# Patient Record
Sex: FEMALE | Race: WHITE | NOT HISPANIC OR LATINO | Employment: OTHER | URBAN - METROPOLITAN AREA
[De-identification: names, ages, dates, MRNs, and addresses within clinical notes are randomized per-mention and may not be internally consistent; named-entity substitution may affect disease eponyms.]

---

## 2023-02-19 PROBLEM — R29.898 DECREASED ROM OF NECK: Status: ACTIVE | Noted: 2023-02-19

## 2023-02-19 PROBLEM — R93.89 ABNORMAL CHEST CT: Status: ACTIVE | Noted: 2023-02-19

## 2023-02-19 PROBLEM — R06.02 SHORTNESS OF BREATH: Status: ACTIVE | Noted: 2023-02-19

## 2023-02-19 PROBLEM — I50.20 SYSTOLIC CHF (MULTI): Status: ACTIVE | Noted: 2023-02-19

## 2023-02-19 PROBLEM — M54.50 LOW BACK PAIN: Status: ACTIVE | Noted: 2023-02-19

## 2023-02-19 PROBLEM — I73.9 CLAUDICATION OF BOTH LOWER EXTREMITIES (CMS-HCC): Status: ACTIVE | Noted: 2023-02-19

## 2023-02-19 PROBLEM — R05.9 COUGH: Status: RESOLVED | Noted: 2023-02-19 | Resolved: 2023-02-19

## 2023-02-19 PROBLEM — S16.1XXA CERVICAL MYOFASCIAL STRAIN: Status: ACTIVE | Noted: 2023-02-19

## 2023-02-19 PROBLEM — Z86.010 HISTORY OF COLON POLYPS: Status: ACTIVE | Noted: 2023-02-19

## 2023-02-19 PROBLEM — M48.061 LUMBAR SPINAL STENOSIS: Status: ACTIVE | Noted: 2023-02-19

## 2023-02-19 PROBLEM — G47.00 INSOMNIA: Status: ACTIVE | Noted: 2023-02-19

## 2023-02-19 PROBLEM — H91.93 BILATERAL HEARING LOSS: Status: ACTIVE | Noted: 2023-02-19

## 2023-02-19 PROBLEM — M25.552 HIP PAIN, BILATERAL: Status: ACTIVE | Noted: 2023-02-19

## 2023-02-19 PROBLEM — M65.30 TRIGGER FINGER: Status: ACTIVE | Noted: 2023-02-19

## 2023-02-19 PROBLEM — M70.61 GREATER TROCHANTERIC BURSITIS OF BOTH HIPS: Status: ACTIVE | Noted: 2023-02-19

## 2023-02-19 PROBLEM — R26.89 BALANCE PROBLEM: Status: ACTIVE | Noted: 2023-02-19

## 2023-02-19 PROBLEM — M85.80 OSTEOPENIA: Status: ACTIVE | Noted: 2023-02-19

## 2023-02-19 PROBLEM — M25.562 LEFT KNEE PAIN: Status: ACTIVE | Noted: 2023-02-19

## 2023-02-19 PROBLEM — I25.10 CORONARY ARTERY ARTERIOSCLEROSIS: Status: ACTIVE | Noted: 2023-02-19

## 2023-02-19 PROBLEM — K29.70 GASTRITIS: Status: RESOLVED | Noted: 2023-02-19 | Resolved: 2023-02-19

## 2023-02-19 PROBLEM — Z86.0100 HISTORY OF COLON POLYPS: Status: ACTIVE | Noted: 2023-02-19

## 2023-02-19 PROBLEM — M54.14 THORACIC NEURITIS: Status: ACTIVE | Noted: 2023-02-19

## 2023-02-19 PROBLEM — J20.9 BRONCHITIS, ACUTE, WITH BRONCHOSPASM: Status: RESOLVED | Noted: 2023-02-19 | Resolved: 2023-02-19

## 2023-02-19 PROBLEM — E55.9 VITAMIN D DEFICIENCY: Status: ACTIVE | Noted: 2023-02-19

## 2023-02-19 PROBLEM — E04.2 NONTOXIC MULTINODULAR GOITER: Status: ACTIVE | Noted: 2023-02-19

## 2023-02-19 PROBLEM — I10 BENIGN ESSENTIAL HYPERTENSION: Status: ACTIVE | Noted: 2023-02-19

## 2023-02-19 PROBLEM — R09.81 NASAL CONGESTION: Status: RESOLVED | Noted: 2023-02-19 | Resolved: 2023-02-19

## 2023-02-19 PROBLEM — M53.3 SACROILIAC JOINT DYSFUNCTION OF LEFT SIDE: Status: ACTIVE | Noted: 2023-02-19

## 2023-02-19 PROBLEM — R29.898 RIGHT ARM WEAKNESS: Status: RESOLVED | Noted: 2023-02-19 | Resolved: 2023-02-19

## 2023-02-19 PROBLEM — M47.816 LUMBAR SPONDYLOSIS: Status: ACTIVE | Noted: 2023-02-19

## 2023-02-19 PROBLEM — R63.4 ABNORMAL WEIGHT LOSS: Status: ACTIVE | Noted: 2023-02-19

## 2023-02-19 PROBLEM — M70.62 GREATER TROCHANTERIC BURSITIS OF BOTH HIPS: Status: ACTIVE | Noted: 2023-02-19

## 2023-02-19 PROBLEM — D64.9 ANEMIA: Status: ACTIVE | Noted: 2023-02-19

## 2023-02-19 PROBLEM — K21.9 GERD (GASTROESOPHAGEAL REFLUX DISEASE): Status: ACTIVE | Noted: 2023-02-19

## 2023-02-19 PROBLEM — N32.81 OVERACTIVE BLADDER: Status: ACTIVE | Noted: 2023-02-19

## 2023-02-19 PROBLEM — M54.16 RIGHT LUMBAR RADICULOPATHY: Status: ACTIVE | Noted: 2023-02-19

## 2023-02-19 PROBLEM — R29.898 WEAKNESS OF LEFT HIP: Status: ACTIVE | Noted: 2023-02-19

## 2023-02-19 PROBLEM — R07.9 CHEST PAIN: Status: ACTIVE | Noted: 2023-02-19

## 2023-02-19 PROBLEM — F32.5 MAJOR DEPRESSION IN REMISSION (CMS-HCC): Status: ACTIVE | Noted: 2023-02-19

## 2023-02-19 PROBLEM — M79.18 MYOFASCIAL PAIN SYNDROME: Status: ACTIVE | Noted: 2023-02-19

## 2023-02-19 PROBLEM — R35.0 INCREASED URINARY FREQUENCY: Status: ACTIVE | Noted: 2023-02-19

## 2023-02-19 PROBLEM — M25.551 HIP PAIN, BILATERAL: Status: ACTIVE | Noted: 2023-02-19

## 2023-02-19 PROBLEM — M16.9 HIP OSTEOARTHRITIS: Status: ACTIVE | Noted: 2023-02-19

## 2023-02-19 PROBLEM — M54.2 NECK PAIN: Status: RESOLVED | Noted: 2023-02-19 | Resolved: 2023-02-19

## 2023-02-19 PROBLEM — H35.30 MACULAR DEGENERATION, LEFT EYE: Status: ACTIVE | Noted: 2023-02-19

## 2023-02-19 PROBLEM — J98.01 BRONCHOSPASM, ACUTE: Status: RESOLVED | Noted: 2023-02-19 | Resolved: 2023-02-19

## 2023-02-19 RX ORDER — VITS A,C,E/LUTEIN/MINERALS 300MCG-200
1 TABLET ORAL DAILY
COMMUNITY
Start: 2016-06-21

## 2023-02-19 RX ORDER — SERTRALINE HYDROCHLORIDE 50 MG/1
1 TABLET, FILM COATED ORAL DAILY
COMMUNITY
Start: 2013-04-16 | End: 2024-02-12

## 2023-02-19 RX ORDER — ASPIRIN 81 MG/1
1 TABLET ORAL DAILY
COMMUNITY
Start: 2019-08-16

## 2023-02-19 RX ORDER — ACETAMINOPHEN 500 MG
2 TABLET ORAL
COMMUNITY
Start: 2019-10-10

## 2023-02-19 RX ORDER — IPRATROPIUM BROMIDE 42 UG/1
1 SPRAY, METERED NASAL 3 TIMES DAILY
COMMUNITY
Start: 2022-03-09

## 2023-02-19 RX ORDER — ROSUVASTATIN CALCIUM 5 MG/1
1 TABLET, COATED ORAL DAILY
COMMUNITY
Start: 2020-08-04 | End: 2023-05-18

## 2023-02-19 RX ORDER — NITROGLYCERIN 0.4 MG/1
1 TABLET SUBLINGUAL EVERY 5 MIN PRN
COMMUNITY
Start: 2019-10-09

## 2023-02-19 RX ORDER — AMLODIPINE BESYLATE 10 MG/1
1 TABLET ORAL DAILY
COMMUNITY
Start: 2022-06-22

## 2023-02-19 RX ORDER — LORAZEPAM 0.5 MG/1
1 TABLET ORAL NIGHTLY PRN
COMMUNITY
Start: 2013-05-22 | End: 2023-08-16 | Stop reason: SDUPTHER

## 2023-02-19 RX ORDER — ALBUTEROL SULFATE 90 UG/1
1-2 AEROSOL, METERED RESPIRATORY (INHALATION) EVERY 4 HOURS PRN
COMMUNITY
Start: 2022-04-15 | End: 2023-06-06 | Stop reason: SDUPTHER

## 2023-02-19 RX ORDER — MULTIVITAMIN
1 TABLET ORAL DAILY
COMMUNITY
Start: 2020-05-12

## 2023-02-19 RX ORDER — ASCORBIC ACID 500 MG
1 TABLET ORAL DAILY
COMMUNITY
Start: 2016-06-21

## 2023-02-19 RX ORDER — METOPROLOL SUCCINATE 25 MG/1
0.5 TABLET, EXTENDED RELEASE ORAL
COMMUNITY
Start: 2021-10-12 | End: 2024-05-02 | Stop reason: SDUPTHER

## 2023-05-17 DIAGNOSIS — I25.10 CORONARY ARTERY ARTERIOSCLEROSIS: ICD-10-CM

## 2023-05-18 RX ORDER — ROSUVASTATIN CALCIUM 5 MG/1
TABLET, COATED ORAL
Qty: 90 TABLET | Refills: 3 | Status: SHIPPED | OUTPATIENT
Start: 2023-05-18

## 2023-05-23 ENCOUNTER — TELEPHONE (OUTPATIENT)
Dept: PRIMARY CARE | Facility: CLINIC | Age: 83
End: 2023-05-23
Payer: MEDICARE

## 2023-05-23 DIAGNOSIS — J40 BRONCHITIS: Primary | ICD-10-CM

## 2023-05-23 RX ORDER — AZITHROMYCIN 250 MG/1
TABLET, FILM COATED ORAL
Qty: 6 TABLET | Refills: 0 | Status: SHIPPED | OUTPATIENT
Start: 2023-05-23 | End: 2023-05-28

## 2023-05-23 RX ORDER — PREDNISONE 10 MG/1
TABLET ORAL
Qty: 33 TABLET | Refills: 0 | Status: SHIPPED | OUTPATIENT
Start: 2023-05-23 | End: 2023-05-31

## 2023-05-23 NOTE — TELEPHONE ENCOUNTER
Patient has URI x 2 weeks has moved into her chest-coughing some sob-asking for antibx   [facility]    Allergy-Codeine/Apixaban/Atorvastatin/Rivaroxaban     Called patient and informed-she has appt. Next week will recheck then.

## 2023-05-29 ASSESSMENT — ENCOUNTER SYMPTOMS
POLYDIPSIA: 0
BACK PAIN: 0
HEADACHES: 0
PALPITATIONS: 0
FEVER: 0
ADENOPATHY: 0
HEMATURIA: 0
SHORTNESS OF BREATH: 0
EYE PAIN: 0
FATIGUE: 0
EYE REDNESS: 0
BRUISES/BLEEDS EASILY: 0
BLOOD IN STOOL: 0
CHILLS: 0
HALLUCINATIONS: 0
COUGH: 0
ABDOMINAL PAIN: 0
NECK STIFFNESS: 0
DYSURIA: 0
RHINORRHEA: 0
WOUND: 0
SORE THROAT: 0
WEAKNESS: 0

## 2023-05-29 NOTE — PROGRESS NOTES
Subjective   Reason for Visit: Marli Duckworth is an 83 y.o. female here for a Medicare Wellness visit.               HPI    Patient Care Team:  Scooby Monzon MD as PCP - General  Scooby Monzon MD as PCP - [employer]vantage PCP     Review of Systems   Constitutional:  Negative for chills, fatigue and fever.   HENT:  Negative for rhinorrhea and sore throat.    Eyes:  Negative for pain and redness.   Respiratory:  Negative for cough and shortness of breath.    Cardiovascular:  Negative for chest pain and palpitations.   Gastrointestinal:  Negative for abdominal pain and blood in stool.   Endocrine: Negative for polydipsia and polyuria.   Genitourinary:  Negative for dysuria and hematuria.   Musculoskeletal:  Negative for back pain and neck stiffness.   Skin:  Negative for rash and wound.   Allergic/Immunologic: Negative for environmental allergies and food allergies.   Neurological:  Negative for weakness and headaches.   Hematological:  Negative for adenopathy. Does not bruise/bleed easily.   Psychiatric/Behavioral:  Negative for hallucinations and suicidal ideas.      Objective   Vitals:  There were no vitals taken for this visit.      Physical Exam  Vitals reviewed.   Constitutional:       General: She is not in acute distress.     Appearance: She is not ill-appearing.   HENT:      Head: Normocephalic and atraumatic.      Right Ear: Tympanic membrane normal.      Left Ear: Tympanic membrane normal.      Nose: No congestion or rhinorrhea.      Mouth/Throat:      Pharynx: No oropharyngeal exudate or posterior oropharyngeal erythema.   Eyes:      Extraocular Movements: Extraocular movements intact.      Conjunctiva/sclera: Conjunctivae normal.      Pupils: Pupils are equal, round, and reactive to light.   Cardiovascular:      Rate and Rhythm: Normal rate and regular rhythm.      Heart sounds: No murmur heard.     No friction rub. No gallop.   Pulmonary:      Effort: Pulmonary effort is normal.       Breath sounds: Normal breath sounds. No wheezing, rhonchi or rales.   Abdominal:      General: There is no distension.      Palpations: Abdomen is soft.      Tenderness: There is no abdominal tenderness. There is no guarding or rebound.   Musculoskeletal:         General: No swelling or deformity.      Cervical back: Normal range of motion and neck supple.      Right lower leg: No edema.      Left lower leg: No edema.   Skin:     Capillary Refill: Capillary refill takes less than 2 seconds.      Coloration: Skin is not jaundiced.      Findings: No rash.   Neurological:      General: No focal deficit present.      Mental Status: She is alert.      Motor: No weakness.   Psychiatric:         Mood and Affect: Mood normal.         Behavior: Behavior normal.     Assessment/Plan   Problem List Items Addressed This Visit    None           # New Left neuroclaudication and left weakness to dorsiflexion, knee extension and hip flexion -- check l-spine xray, repeat MRI, consult Dr León and if needed Dr Arenas with neurosurgery. Order PT and EMG. Normal ABGs a year ago 2021.    # cervical spinal stenosis and lumbar spondylosis -- tried low dose diclofenac for pain in the past. uses aleve sparingly. Starting PT at [facility] 8/2020, improved with prednisone burst in July. Feels paresthesia when turns to the right. May need to consult Dr León if no better.     # Lumbar herniated discs -- improved after LAYTON per Dr León 2017.    # Hx Left knee pain -- anserine bursitis and neuropathy at fibular head -- improved with stretching exercises, ice, rest, brace and depomedrol 80 mg IM x 1 a year ago (7/26/21). If no better can refer to ortho.     # Insomnia, well controlled with lorazepam 0.5 mg q.h.s.   OARRS report reviewed 8/4/2020, 2/2/2021, 7/26/21, 2/1/2022, 7/29/22  BENZO AGREEMENT Signed 4/6/18.4/10/19 4/6/2020, 7/26/21, 7/29/22  Tox screen 4/6/18, 7/10/19, 8/4/2020.2/1/22    # Vague abdominal pain and bloating after meals -- c/w  suspected mesenteric ischemia based on CT findings of atherosclerosis from 2020. Will try ntg as needed. claims she was previously on isorbide but was taken off. Recommend sticking to smaller meals. If no improvement will refer to Dr Mackenzie for Colonoscopy and EGD.     # CAD -- recent ACS s/p DONNY x 2 to RCA 8/2019 -- continue Statin, BB, Plavix/ASA and follow up with cardio. Completed 4 visits of cardiac rehab. Has 65% obstruction remaining that might require future stenting if symptoms recur. NTG sl as needed for cp. Has not needed yet. To call office or cardiologist or go to ER in case she needs to use them.     # Left SI joint pain -- Still bothersome despite doing stretching exercises demonstrated at 10/2018 visit. Since no better with stretching can refer to PT/Dr De La Cruz.     # Osteoporosis (T -2.4) -- despite previous HRT. Stablilizied on Dexa scan 2018. declines generic fosamax 70 weekly or generic boniva every month 150 mg. Had reclast in May 2016, 2017, 2018 (confirmed with Brenda Guardado RN in infusion center). Hold off on further reclast until recheck DEXA again after 3/5/2020. If worsening can try prolia injections. If stable -- now that she has quit smoking she will likely be good to go without anything. Started on vitamin D 4000 units daily.    # hypertension. well tolerated on amlodipine 10 mg once a day, metoprolol 25 ER.     # depression continue zoloft 50. Buproprion did NOT help her quit smoking so stopped.    # hx smoking --quit smoking Aug 2019!!!. Back to 1/2 ppd 2022.    # OAB --. DID NOT TOLERATE OXYBUTYNIN or even Toviaz. Cannot afford myrbetriq.     #GERD/Gastritis -- start acidophilus and otc pepcid 20 mg daily. No better with trial of pantoprazole. No more aspirin. No more xarelto.     # Paroxysmal AFIB -- x1 while on prednisone for her back. In sinus rhythm now. No symptoms of recurrence. In ER 4/2-4/3/18 for cardiac rule out -- no sign of recurrent AFIB. Can check holter/event recorder  if symptoms recur.     # trigger finger surgery per Dr Jolley -- 9/2019    # Macular Degeneration -- wet, left eye -- Eylea injections per Dr Dove ([facility]).     # 2/1/2022 [place] Preventative exam: counseled on healthy diet and regular exercise. No longer needs routine mammograms/colonoscopies based on age but had colon polyp in 2019 15 mm in cecum, and 25 mm polypc in 2022 -- repeat in 2023 with Dr Sánchez. Boosted for COVID.     # 2/1/2022 Medicare Wellness -- no falls, depression well controlled with sertraline, quit smoking 8/2019. No dementia. No loss of adls. No opiates. benzos effective to help with sleep. Mild hearing loss exacerbated by everyone wearing masks. Care Team -- myself (pcp), caio (cardio). On reclast for one more dose (5th dose was due in 2021 but never went due to COVID) for osteoporosis. Vaccines up to date. Had 1st shingles vaccine.     # 2/1/2022 I spent 15 minutes face-to-face with this individual discussing their cardiovascular risk and behavioral therapies of nutritional choices, exercise, and elimination of habits contributing to risk. We agreed on a plan addressing reduction of their current cardiovascular risk. For patients with risk calculated >10%, aspirin use was discussed and encouraged unless known allergy or increased risk of bleeding contraindicates use. Patient's 10 year CV risk estimate calculates to: >10%     # 2/1/2022 counseled on smoking <1/2 ppd -- contemplative. 5 minutes.     # 4/2022 Acute bronchitis -- no better with medrol pack and zpak. treat with augmentin and prednisone longer burst and taper.     # 6/26/2022 [place] [facility] -- COPD exacerbation.     Trial of xarelto ended up causing upper gi bleed. Still holding off on aspirin which is ok - -no hx of cad. No f/c/n/v/d/cp/sob. Has stopped ppi, iron, and colace. Takes occasional rolaids.    4/13/16, 4/22/15 us per DR Gilliland benign colloid cysts. no need for fna -- repeat in 3y (2019).   4/21/16 NUCLEAR  STRESS -- no ischemia but had AFLUTTER on ecg. MP   6/16/16 EGD showed reactrive gastropathy, no cancer or HPylori.  9/19/17 s/p L4/L5/S1 medal branch blocks per Dr León.  10/5/17, 7/27/17, 6/22/17 URO -- [facility] --pessary/atrophic vaginitis, cystocele.  6/2018 s/p Right Trigger Finger repair per Shola.  7/25/18 MRI L-Spine progressive herniated discs and spondylosis.  12/3/18 s/p small bowel resection per Dr Hernández.  5/13/19 s/p Sling/cystocele repair -- Brenda.  7/18/19 COLONOSCOPY -- 3 polyps -- Edgar -- repeat in 6 months. 1/2020.  8/2019 Inferior ACS -- s/p DONNY x 2   8/2019 Stress test -- inferior ischemia resolved with rest.  8/2019 ECHO -- ef 40%.  2/25/2020 audiometry -- b/l sensorineural hearing loss -- recommend aides.  5/27/2020 DEXA -- osteopenia -2.4 -- continue calcium and vitamin d supplements and weight bearing exercise.  6/2020 Reclast, previously 5/2016, 5/2017, 5/2018. Missed 2019. Should get 2020 and 2021 then stop -- after completing 5 years of bisophophonate.  1/2021 RETINA -- Juan (at [facility]). Eylea injection. Left Wet MD.  10/12/21 CARDIO -- [facility] -- CAD. NNO. Off Plavix, ON ASA indefinitely.  1/2-1/5/2022 Acute GI Bleed -- COLONOSCOPY 1/7/2022 2.5 cm cecal polyp -- adenoma.

## 2023-05-31 ENCOUNTER — APPOINTMENT (OUTPATIENT)
Dept: PRIMARY CARE | Facility: CLINIC | Age: 83
End: 2023-05-31
Payer: MEDICARE

## 2023-06-06 DIAGNOSIS — R06.02 SHORTNESS OF BREATH: Primary | ICD-10-CM

## 2023-06-06 RX ORDER — ALBUTEROL SULFATE 90 UG/1
1-2 AEROSOL, METERED RESPIRATORY (INHALATION) EVERY 4 HOURS PRN
Qty: 18 G | Refills: 3 | Status: SHIPPED | OUTPATIENT
Start: 2023-06-06

## 2023-08-16 ENCOUNTER — OFFICE VISIT (OUTPATIENT)
Dept: PRIMARY CARE | Facility: CLINIC | Age: 83
End: 2023-08-16
Payer: MEDICARE

## 2023-08-16 VITALS
HEIGHT: 61 IN | BODY MASS INDEX: 20.69 KG/M2 | OXYGEN SATURATION: 97 % | SYSTOLIC BLOOD PRESSURE: 124 MMHG | HEART RATE: 65 BPM | RESPIRATION RATE: 16 BRPM | DIASTOLIC BLOOD PRESSURE: 64 MMHG | WEIGHT: 109.6 LBS | TEMPERATURE: 97.7 F

## 2023-08-16 DIAGNOSIS — I50.22 CHRONIC SYSTOLIC CONGESTIVE HEART FAILURE (MULTI): ICD-10-CM

## 2023-08-16 DIAGNOSIS — Z00.00 MEDICARE ANNUAL WELLNESS VISIT, SUBSEQUENT: Primary | ICD-10-CM

## 2023-08-16 DIAGNOSIS — E55.9 VITAMIN D INSUFFICIENCY: ICD-10-CM

## 2023-08-16 DIAGNOSIS — Z12.11 SCREEN FOR COLON CANCER: ICD-10-CM

## 2023-08-16 DIAGNOSIS — I25.10 CORONARY ARTERY ARTERIOSCLEROSIS: ICD-10-CM

## 2023-08-16 DIAGNOSIS — F51.01 PRIMARY INSOMNIA: ICD-10-CM

## 2023-08-16 DIAGNOSIS — I73.9 CLAUDICATION OF BOTH LOWER EXTREMITIES (CMS-HCC): ICD-10-CM

## 2023-08-16 DIAGNOSIS — I48.0 PAROXYSMAL A-FIB (MULTI): ICD-10-CM

## 2023-08-16 DIAGNOSIS — F17.200 CURRENT SMOKER: ICD-10-CM

## 2023-08-16 DIAGNOSIS — Z79.899 MEDICATION MANAGEMENT: ICD-10-CM

## 2023-08-16 DIAGNOSIS — F32.5 MAJOR DEPRESSION IN REMISSION (CMS-HCC): ICD-10-CM

## 2023-08-16 DIAGNOSIS — J43.8 OTHER EMPHYSEMA (MULTI): ICD-10-CM

## 2023-08-16 DIAGNOSIS — M16.9 OSTEOARTHRITIS OF HIP, UNSPECIFIED LATERALITY, UNSPECIFIED OSTEOARTHRITIS TYPE: ICD-10-CM

## 2023-08-16 DIAGNOSIS — I10 BENIGN ESSENTIAL HYPERTENSION: ICD-10-CM

## 2023-08-16 PROCEDURE — 1170F FXNL STATUS ASSESSED: CPT | Performed by: FAMILY MEDICINE

## 2023-08-16 PROCEDURE — 1160F RVW MEDS BY RX/DR IN RCRD: CPT | Performed by: FAMILY MEDICINE

## 2023-08-16 PROCEDURE — 3078F DIAST BP <80 MM HG: CPT | Performed by: FAMILY MEDICINE

## 2023-08-16 PROCEDURE — 1159F MED LIST DOCD IN RCRD: CPT | Performed by: FAMILY MEDICINE

## 2023-08-16 PROCEDURE — 99406 BEHAV CHNG SMOKING 3-10 MIN: CPT | Performed by: FAMILY MEDICINE

## 2023-08-16 PROCEDURE — 99214 OFFICE O/P EST MOD 30 MIN: CPT | Performed by: FAMILY MEDICINE

## 2023-08-16 PROCEDURE — 3074F SYST BP LT 130 MM HG: CPT | Performed by: FAMILY MEDICINE

## 2023-08-16 PROCEDURE — 1126F AMNT PAIN NOTED NONE PRSNT: CPT | Performed by: FAMILY MEDICINE

## 2023-08-16 PROCEDURE — 4004F PT TOBACCO SCREEN RCVD TLK: CPT | Performed by: FAMILY MEDICINE

## 2023-08-16 PROCEDURE — 99397 PER PM REEVAL EST PAT 65+ YR: CPT | Performed by: FAMILY MEDICINE

## 2023-08-16 PROCEDURE — G0439 PPPS, SUBSEQ VISIT: HCPCS | Performed by: FAMILY MEDICINE

## 2023-08-16 PROCEDURE — G0446 INTENS BEHAVE THER CARDIO DX: HCPCS | Performed by: FAMILY MEDICINE

## 2023-08-16 RX ORDER — LORAZEPAM 0.5 MG/1
0.5 TABLET ORAL NIGHTLY PRN
Qty: 90 TABLET | Refills: 0 | Status: SHIPPED | OUTPATIENT
Start: 2023-08-16 | End: 2024-01-12

## 2023-08-16 ASSESSMENT — ENCOUNTER SYMPTOMS
OCCASIONAL FEELINGS OF UNSTEADINESS: 0
BACK PAIN: 0
LOSS OF SENSATION IN FEET: 0
ABDOMINAL PAIN: 0
EYE REDNESS: 0
BRUISES/BLEEDS EASILY: 0
NECK STIFFNESS: 0
HALLUCINATIONS: 0
WEAKNESS: 0
FATIGUE: 0
HEMATURIA: 0
RHINORRHEA: 0
ADENOPATHY: 0
SORE THROAT: 0
BLOOD IN STOOL: 0
FEVER: 0
PALPITATIONS: 0
CHILLS: 0
POLYDIPSIA: 0
EYE PAIN: 0
DEPRESSION: 0
HEADACHES: 0
WOUND: 0
DYSURIA: 0
COUGH: 0
SHORTNESS OF BREATH: 0

## 2023-08-16 ASSESSMENT — ACTIVITIES OF DAILY LIVING (ADL)
DOING_HOUSEWORK: INDEPENDENT
MANAGING_FINANCES: INDEPENDENT
DRESSING: INDEPENDENT
TAKING_MEDICATION: INDEPENDENT
BATHING: INDEPENDENT
GROCERY_SHOPPING: INDEPENDENT

## 2023-08-16 ASSESSMENT — PATIENT HEALTH QUESTIONNAIRE - PHQ9
1. LITTLE INTEREST OR PLEASURE IN DOING THINGS: NOT AT ALL
SUM OF ALL RESPONSES TO PHQ9 QUESTIONS 1 AND 2: 0
2. FEELING DOWN, DEPRESSED OR HOPELESS: NOT AT ALL

## 2023-08-16 NOTE — PROGRESS NOTES
Subjective   Reason for Visit: Marli Duckworth is an 83 y.o. female here for a Medicare Wellness visit. Annual [employer] preventative exam and recheck on Insomnia, COPD and CAD.     OARRS:  Scooby Monzon MD on 8/16/2023 11:54 AM  I have personally reviewed the OARRS report for Marli Duckworth. I have considered the risks of abuse, dependence, addiction and diversion, I believe that it is clinically appropriate for Marli Duckworth to be prescribed this medication, and I have the following concerns none    Is the patient prescribed a combination of a benzodiazepine and opioid?  No    Last Urine Drug Screen / ordered today: Yes  Recent Results (from the past 80989 hour(s))   OPIATE/OPIOID/BENZO PRESCRIPTION COMPLIANCE    Collection Time: 02/01/22 11:48 AM   Result Value Ref Range    DRUG SCREEN COMMENT URINE SEE BELOW     Creatine, Urine 86.0 mg/dL    Amphetamine Screen, Urine PRESUMPTIVE NEGATIVE NEGATIVE    Barbiturate Screen, Urine PRESUMPTIVE NEGATIVE NEGATIVE    Cannabinoid Screen, Urine PRESUMPTIVE NEGATIVE NEGATIVE    Cocaine Screen, Urine PRESUMPTIVE NEGATIVE NEGATIVE    PCP Screen, Urine PRESUMPTIVE NEGATIVE NEGATIVE    7-Aminoclonazepam <25 Cutoff <25 ng/mL    Alpha-Hydroxyalprazolam <25 Cutoff <25 ng/mL    Alpha-Hydroxymidazolam <25 Cutoff <25 ng/mL    Alprazolam <25 Cutoff <25 ng/mL    Chlordiazepoxide <25 Cutoff <25 ng/mL    Clonazepam <25 Cutoff <25 ng/mL    Diazepam <25 Cutoff <25 ng/mL    Lorazepam 171 (A) Cutoff <25 ng/mL    Midazolam <25 Cutoff <25 ng/mL    Nordiazepam <25 Cutoff <25 ng/mL    Oxazepam <25 Cutoff <25 ng/mL    Temazepam <25 Cutoff <25 ng/mL    Zolpidem <25 Cutoff <25 ng/mL    Zolpidem Metabolite (ZCA) <25 Cutoff <25 ng/mL    6-Acetylmorphine <25 Cutoff <25 ng/mL    Codeine <50 Cutoff <50 ng/mL    Hydrocodone <25 Cutoff <25 ng/mL    Hydromorphone <25 Cutoff <25 ng/mL    Morphine Urine <50 Cutoff <50 ng/mL    Norhydrocodone <25 Cutoff <25 ng/mL    Noroxycodone <25 Cutoff <25 ng/mL     Oxycodone <25 Cutoff <25 ng/mL    Oxymorphone <25 Cutoff <25 ng/mL    Tramadol <50 Cutoff <50 ng/mL    O-Desmethyltramadol <50 Cutoff <50 ng/mL    Fentanyl <2.5 Cutoff<2.5 ng/mL    Norfentanyl <2.5 Cutoff<2.5 ng/mL    METHADONE CONFIRMATION,URINE <25 Cutoff <25 ng/mL    EDDP <25 Cutoff <25 ng/mL     Results are as expected.     Controlled Substance Agreement:  Date of the Last Agreement: 8/16/23  Reviewed Controlled Substance Agreement including but not limited to the benefits, risks, and alternatives to treatment with a Controlled Substance medication(s).    Benzodiazepines:  What is the patient's goal of therapy? Sleep induction   Is this being achieved with current treatment? yes    KATARINA-7:  No data recorded  -- not taking for anxiety    Activities of Daily Living:   Is your overall impression that this patient is benefiting (symptom reduction outweighs side effects) from benzodiazepine therapy? Yes     1. Physical Functioning: Same  2. Family Relationship: Same  3. Social Relationship: Same  4. Mood: Same  5. Sleep Patterns: Better  6. Overall Function: Better    Past Medical, Surgical, and Family History reviewed and updated in chart.    Reviewed all medications by prescribing practitioner or clinical pharmacist (such as prescriptions, OTCs, herbal therapies and supplements) and documented in the medical record.        Patient Care Team:  Scooby Monzon MD as PCP - General  Scooby Monzon MD as PCP - [employer] Medicare Advantage PCP     Review of Systems   Constitutional:  Negative for chills, fatigue and fever.   HENT:  Negative for rhinorrhea and sore throat.    Eyes:  Negative for pain and redness.   Respiratory:  Negative for cough and shortness of breath.    Cardiovascular:  Negative for chest pain and palpitations.   Gastrointestinal:  Negative for abdominal pain and blood in stool.   Endocrine: Negative for polydipsia and polyuria.   Genitourinary:  Negative for dysuria and hematuria.  "  Musculoskeletal:  Negative for back pain and neck stiffness.   Skin:  Negative for rash and wound.   Allergic/Immunologic: Negative for environmental allergies and food allergies.   Neurological:  Negative for weakness and headaches.   Hematological:  Negative for adenopathy. Does not bruise/bleed easily.   Psychiatric/Behavioral:  Negative for hallucinations and suicidal ideas.        Objective   Vitals:  /64 (BP Location: Right arm, Patient Position: Sitting, BP Cuff Size: Adult)   Pulse 65   Temp 36.5 °C (97.7 °F) (Temporal)   Resp 16   Ht 1.537 m (5' 0.5\")   Wt 49.7 kg (109 lb 9.6 oz)   SpO2 97%   BMI 21.05 kg/m²       Physical Exam  Vitals reviewed.   Constitutional:       General: She is not in acute distress.     Appearance: She is not ill-appearing.   HENT:      Head: Normocephalic and atraumatic.      Right Ear: Tympanic membrane normal.      Left Ear: Tympanic membrane normal.      Nose: No congestion or rhinorrhea.      Mouth/Throat:      Pharynx: No oropharyngeal exudate or posterior oropharyngeal erythema.   Eyes:      Extraocular Movements: Extraocular movements intact.      Conjunctiva/sclera: Conjunctivae normal.      Pupils: Pupils are equal, round, and reactive to light.   Cardiovascular:      Rate and Rhythm: Normal rate and regular rhythm.      Heart sounds: No murmur heard.     No friction rub. No gallop.   Pulmonary:      Effort: Pulmonary effort is normal.      Breath sounds: Normal breath sounds. No wheezing, rhonchi or rales.   Abdominal:      General: There is no distension.      Palpations: Abdomen is soft.      Tenderness: There is no abdominal tenderness. There is no guarding or rebound.   Musculoskeletal:         General: No swelling or deformity.      Cervical back: Normal range of motion and neck supple.      Right lower leg: No edema.      Left lower leg: No edema.   Skin:     Capillary Refill: Capillary refill takes less than 2 seconds.      Coloration: Skin is not " jaundiced.      Findings: No rash.   Neurological:      General: No focal deficit present.      Mental Status: She is alert.      Motor: No weakness.   Psychiatric:         Mood and Affect: Mood normal.         Behavior: Behavior normal.         Assessment/Plan   Problem List Items Addressed This Visit       Claudication of both lower extremities (CMS/[facility])    Overview     # New Left neuroclaudication and left weakness to dorsiflexion, knee extension and hip flexion -- check l-spine xray, repeat MRI, consult Dr León and if needed Dr Arenas with neurosurgery. Order PT and EMG. Normal ABGs a year ago 2021.    # Vague abdominal pain and bloating after meals -- c/w suspected mesenteric ischemia based on CT findings of atherosclerosis from 2020. Will try ntg as needed. claims she was previously on isorbide but was taken off. Recommend sticking to smaller meals.         Coronary artery arteriosclerosis    Overview     # CAD -- recent ACS s/p DONNY x 2 to RCA 8/2019 -- continue Statin, BB, Plavix/ASA and follow up with cardio. Completed 4 visits of cardiac rehab. Has 65% obstruction remaining that might require future stenting if symptoms recur. NTG sl as needed for cp. Has not needed yet. To call office or cardiologist or go to ER in case she needs to use them.             Insomnia    Overview     # Insomnia, well controlled with lorazepam 0.5 mg q.h.s.   OARRS report reviewed  8/16/23  BENZO AGREEMENT Signed  8/16/23  Tox screen sent 8/16/23         Major depression in remission (CMS/[facility])    Overview     # depression continue zoloft 50. Buproprion did NOT help her quit smoking so stopped.              Systolic CHF (CMS/[facility])    Medicare annual wellness visit, subsequent - Primary    Overview     8/16/23 Annual [employer] preventative exam: Encouraged to quit smoking.  No longer needs routine mammograms/colonoscopies based on age but had colon polyp in 2019 15 mm in cecum, and 25 mm polypc in 2022 -- repeat in 2023 with Dr Sánchez.  Boosted for COVID.  Encourage covid booster and HDFlu vaccine in late September or October.     8/16/23 Medicare Wellness -- no falls, depression well controlled with sertraline, quit smoking 8/2019 but backto every once in a while.  No dementia. No loss of adls. No opiates. benzos effective to help with sleep. Mild hearing loss exacerbated by everyone wearing masks. Care Team -- myself (pcp), caio (cardio). On reclast for one more dose (5th dose was due in 2021 but never went due to COVID) for osteoporosis. Vaccines up to date. Had 1st shingles vaccine.      # 8/16/23 I spent 15 minutes face-to-face with this individual discussing their cardiovascular risk and behavioral therapies of nutritional choices, exercise, and elimination of habits contributing to risk. We agreed on a plan addressing reduction of their current cardiovascular risk. For patients with risk calculated >10%, aspirin use was discussed and encouraged unless known allergy or increased risk of bleeding contraindicates use. Patient's 10 year CV risk estimate calculates to: >10%      # 8/16/23 counseled on smoking <1/2 ppd -- contemplative. 5 minutes.     # OAB --. DID NOT TOLERATE OXYBUTYNIN or even Toviaz. Cannot afford myrbetriq.     # trigger finger surgery per Dr Jolley -- 9/2019     # Macular Degeneration -- wet, left eye -- Eylea injections per Dr Dove ([facility]).      # 4/2022 Acute bronchitis -- no better with medrol pack and zpak. treat with augmentin and prednisone longer burst and taper.                Paroxysmal A-fib (CMS/[facility])    Overview     # Paroxysmal AFIB -- x1 while on prednisone for her back. In sinus rhythm now. No symptoms of recurrence. In ER 4/2-4/3/18 for cardiac rule out -- no sign of recurrent AFIB. Can check holter/event recorder if symptoms recur.          Other emphysema (CMS/[facility])    Overview     # 6/26/2022 [facility] -- COPD exacerbation.           Other Visit Diagnoses       BMI 21.0-21.9, adult         Current smoker

## 2023-08-17 ENCOUNTER — LAB (OUTPATIENT)
Dept: LAB | Facility: LAB | Age: 83
End: 2023-08-17
Payer: MEDICARE

## 2023-08-17 DIAGNOSIS — M16.9 OSTEOARTHRITIS OF HIP, UNSPECIFIED LATERALITY, UNSPECIFIED OSTEOARTHRITIS TYPE: ICD-10-CM

## 2023-08-17 DIAGNOSIS — I10 BENIGN ESSENTIAL HYPERTENSION: ICD-10-CM

## 2023-08-17 DIAGNOSIS — Z79.899 MEDICATION MANAGEMENT: ICD-10-CM

## 2023-08-17 DIAGNOSIS — E55.9 VITAMIN D INSUFFICIENCY: ICD-10-CM

## 2023-08-17 LAB
ALANINE AMINOTRANSFERASE (SGPT) (U/L) IN SER/PLAS: 13 U/L (ref 7–45)
ALBUMIN (G/DL) IN SER/PLAS: 4.2 G/DL (ref 3.4–5)
ALKALINE PHOSPHATASE (U/L) IN SER/PLAS: 54 U/L (ref 33–136)
ANION GAP IN SER/PLAS: 13 MMOL/L (ref 10–20)
ASPARTATE AMINOTRANSFERASE (SGOT) (U/L) IN SER/PLAS: 20 U/L (ref 9–39)
BILIRUBIN TOTAL (MG/DL) IN SER/PLAS: 0.6 MG/DL (ref 0–1.2)
CALCIUM (MG/DL) IN SER/PLAS: 9.2 MG/DL (ref 8.6–10.3)
CARBON DIOXIDE, TOTAL (MMOL/L) IN SER/PLAS: 30 MMOL/L (ref 21–32)
CHLORIDE (MMOL/L) IN SER/PLAS: 104 MMOL/L (ref 98–107)
CHOLESTEROL (MG/DL) IN SER/PLAS: 107 MG/DL (ref 0–199)
CHOLESTEROL IN HDL (MG/DL) IN SER/PLAS: 37.4 MG/DL
CHOLESTEROL/HDL RATIO: 2.9
CREATININE (MG/DL) IN SER/PLAS: 0.88 MG/DL (ref 0.5–1.05)
ERYTHROCYTE DISTRIBUTION WIDTH (RATIO) BY AUTOMATED COUNT: 14.1 % (ref 11.5–14.5)
ERYTHROCYTE MEAN CORPUSCULAR HEMOGLOBIN CONCENTRATION (G/DL) BY AUTOMATED: 32.1 G/DL (ref 32–36)
ERYTHROCYTE MEAN CORPUSCULAR VOLUME (FL) BY AUTOMATED COUNT: 97 FL (ref 80–100)
ERYTHROCYTES (10*6/UL) IN BLOOD BY AUTOMATED COUNT: 4.62 X10E12/L (ref 4–5.2)
GFR FEMALE: 65 ML/MIN/1.73M2
GLUCOSE (MG/DL) IN SER/PLAS: 78 MG/DL (ref 74–99)
HEMATOCRIT (%) IN BLOOD BY AUTOMATED COUNT: 44.9 % (ref 36–46)
HEMOGLOBIN (G/DL) IN BLOOD: 14.4 G/DL (ref 12–16)
LDL: 35 MG/DL (ref 0–99)
LEUKOCYTES (10*3/UL) IN BLOOD BY AUTOMATED COUNT: 7.7 X10E9/L (ref 4.4–11.3)
PLATELETS (10*3/UL) IN BLOOD AUTOMATED COUNT: 184 X10E9/L (ref 150–450)
POTASSIUM (MMOL/L) IN SER/PLAS: 4.1 MMOL/L (ref 3.5–5.3)
PROTEIN TOTAL: 6.3 G/DL (ref 6.4–8.2)
SODIUM (MMOL/L) IN SER/PLAS: 143 MMOL/L (ref 136–145)
THYROTROPIN (MIU/L) IN SER/PLAS BY DETECTION LIMIT <= 0.05 MIU/L: 1.13 MIU/L (ref 0.44–3.98)
TRIGLYCERIDE (MG/DL) IN SER/PLAS: 175 MG/DL (ref 0–149)
UREA NITROGEN (MG/DL) IN SER/PLAS: 17 MG/DL (ref 6–23)
VLDL: 35 MG/DL (ref 0–40)

## 2023-08-17 PROCEDURE — 80354 DRUG SCREENING FENTANYL: CPT

## 2023-08-17 PROCEDURE — 80365 DRUG SCREENING OXYCODONE: CPT

## 2023-08-17 PROCEDURE — 36415 COLL VENOUS BLD VENIPUNCTURE: CPT

## 2023-08-17 PROCEDURE — 80373 DRUG SCREENING TRAMADOL: CPT

## 2023-08-17 PROCEDURE — 80053 COMPREHEN METABOLIC PANEL: CPT

## 2023-08-17 PROCEDURE — 80358 DRUG SCREENING METHADONE: CPT

## 2023-08-17 PROCEDURE — 84443 ASSAY THYROID STIM HORMONE: CPT

## 2023-08-17 PROCEDURE — 80061 LIPID PANEL: CPT

## 2023-08-17 PROCEDURE — 82306 VITAMIN D 25 HYDROXY: CPT

## 2023-08-17 PROCEDURE — 80368 SEDATIVE HYPNOTICS: CPT

## 2023-08-17 PROCEDURE — 80361 OPIATES 1 OR MORE: CPT

## 2023-08-17 PROCEDURE — 80346 BENZODIAZEPINES1-12: CPT

## 2023-08-17 PROCEDURE — 80307 DRUG TEST PRSMV CHEM ANLYZR: CPT

## 2023-08-17 PROCEDURE — 85027 COMPLETE CBC AUTOMATED: CPT

## 2023-08-18 LAB — CALCIDIOL (25 OH VITAMIN D3) (NG/ML) IN SER/PLAS: 56 NG/ML

## 2023-08-23 LAB
6-ACETYLMORPHINE: <25 NG/ML
7-AMINOCLONAZEPAM: <25 NG/ML
ALPHA-HYDROXYALPRAZOLAM: <25 NG/ML
ALPHA-HYDROXYMIDAZOLAM: <25 NG/ML
ALPRAZOLAM: <25 NG/ML
AMPHETAMINE (PRESENCE) IN URINE BY SCREEN METHOD: ABNORMAL
BARBITURATES PRESENCE IN URINE BY SCREEN METHOD: ABNORMAL
CANNABINOIDS IN URINE BY SCREEN METHOD: ABNORMAL
CHLORDIAZEPOXIDE: <25 NG/ML
CLONAZEPAM: <25 NG/ML
COCAINE (PRESENCE) IN URINE BY SCREEN METHOD: ABNORMAL
CODEINE: <50 NG/ML
CREATINE, URINE FOR DRUG: 85.7 MG/DL
DIAZEPAM: <25 NG/ML
DRUG SCREEN COMMENT URINE: ABNORMAL
EDDP: <25 NG/ML
FENTANYL CONFIRMATION, URINE: <2.5 NG/ML
HYDROCODONE: <25 NG/ML
HYDROMORPHONE: <25 NG/ML
LORAZEPAM: 219 NG/ML
METHADONE CONFIRMATION,URINE: <25 NG/ML
MIDAZOLAM: <25 NG/ML
MORPHINE URINE: <50 NG/ML
NORDIAZEPAM: <25 NG/ML
NORFENTANYL: <2.5 NG/ML
NORHYDROCODONE: <25 NG/ML
NOROXYCODONE: <25 NG/ML
O-DESMETHYLTRAMADOL: <50 NG/ML
OXAZEPAM: <25 NG/ML
OXYCODONE: <25 NG/ML
OXYMORPHONE: <25 NG/ML
PHENCYCLIDINE (PRESENCE) IN URINE BY SCREEN METHOD: ABNORMAL
TEMAZEPAM: <25 NG/ML
TRAMADOL: <50 NG/ML
ZOLPIDEM METABOLITE (ZCA): <25 NG/ML
ZOLPIDEM: <25 NG/ML

## 2024-01-12 DIAGNOSIS — F51.01 PRIMARY INSOMNIA: ICD-10-CM

## 2024-01-12 RX ORDER — LORAZEPAM 0.5 MG/1
TABLET ORAL
Qty: 90 TABLET | Refills: 0 | Status: SHIPPED | OUTPATIENT
Start: 2024-01-12

## 2024-02-12 DIAGNOSIS — F32.5 MAJOR DEPRESSION IN REMISSION (CMS-HCC): ICD-10-CM

## 2024-02-12 RX ORDER — SERTRALINE HYDROCHLORIDE 50 MG/1
50 TABLET, FILM COATED ORAL DAILY
Qty: 90 TABLET | Refills: 2 | Status: SHIPPED | OUTPATIENT
Start: 2024-02-12

## 2024-05-02 DIAGNOSIS — I10 BENIGN ESSENTIAL HYPERTENSION: ICD-10-CM

## 2024-05-03 RX ORDER — METOPROLOL SUCCINATE 25 MG/1
12.5 TABLET, EXTENDED RELEASE ORAL
Qty: 45 TABLET | Refills: 3 | Status: SHIPPED | OUTPATIENT
Start: 2024-05-03 | End: 2025-05-03

## 2024-05-25 DIAGNOSIS — I10 ESSENTIAL HYPERTENSION: Primary | ICD-10-CM

## 2024-05-28 RX ORDER — AMLODIPINE BESYLATE 5 MG/1
5 TABLET ORAL DAILY
Qty: 90 TABLET | Refills: 0 | Status: SHIPPED | OUTPATIENT
Start: 2024-05-28

## 2024-06-06 ENCOUNTER — APPOINTMENT (OUTPATIENT)
Dept: PRIMARY CARE | Facility: CLINIC | Age: 84
End: 2024-06-06
Payer: MEDICARE

## 2024-06-06 ENCOUNTER — TELEPHONE (OUTPATIENT)
Dept: PRIMARY CARE | Facility: CLINIC | Age: 84
End: 2024-06-06
Payer: MEDICARE

## 2024-06-06 NOTE — PROGRESS NOTES
Outpatient Visit Note    No chief complaint on file.      HPI:  Marli Duckworth is a 84 y.o. female _____ for an annual Medicare Wellness Visit.    Well Exam:  Overall, they describes their health as ___ with no reports of recent illness or hospitalization. They state that their diet is ___. In regards to physical activity, ___. They deny any significant sleep complaints. No reported issues of chest pain, shortness of breath, headaches, vision/hearing changes, abdominal pain, vomiting, diarrhea, melena, hematochezia, constipation or urinary symptoms.    Preventative Health Maintenance:  In regards to preventative health maintenance, last Tdap received ___. Flu shot ____. Pneumonia vaccination series ___. In regards to CRC screening, ___. Hepatitis C screening ____. Shingles vaccination ____. COVID-19 vaccination series ____.           Female:  She has an established relationship with  ____ of OB/GYN who organizes her routine female health maintenance exams. Last Mammogram ____. Last Pap ___. No history of abnormals. DEXA scan ___.      Advanced directives: X    Hearing screen: reports no difficulty with hearing and passes finger rub test bilaterally    Does the patient use opioid medications: No / Yes  Name of medication: N/A  If yes, do they take this medicine appropriately: N/A    How does the patient rate their health status today: Poor / good / excellent    Cognitive Screen:  AAAx3  to person, place and time: Yes/No  3 word recall: Apple, Car, Shoe - Immediate recall: Yes/No        - 5 minutes recall: Yes/No  Impression: No cognitive deficiency observed during screening or encounter today?      Reviewed:   Past Medical History/Allergies:  Yes  Family History:  Yes  Social History:  Yes  Current Medications:  Yes  Vital Signs:  Yes  Advanced Directives:  discussed  Immunizations:  reviewed today  Home Safety:                    Up & Go test > 30 seconds?  No                   Home have rugs; lack grab  bars in bathroom; lack handrail on stairs; have poor lighting?  No                   Hearing difficulties?  No  Geriatric Assessment                   ADL areas requiring assistance:  Does not need help with Dressing, Eating, Ambulating, Toileting, Grooming, Hygiene.                    IADL areas requiring assistance:  Does not need help with Shopping, Housework, Accounting, Transportation, Driving.   Medications: reviewed  Current supplements:  Reviewed and recorded.   Other providers: Reviewed and recorded - Current providers and suppliers: Dr. Cabral.       PHQ9/GAD7:         Past Medical History:   Diagnosis Date    Acute bronchitis due to other specified organisms 11/27/2016    Acute bacterial bronchitis    Acute upper respiratory infection, unspecified     Acute URI    Bronchitis, acute, with bronchospasm 02/19/2023    Cough 02/19/2023    Nasal congestion 02/19/2023    Neck pain 02/19/2023    Old myocardial infarction 08/22/2019    History of myocardial infarction    Personal history of other diseases of the circulatory system 06/07/2016    History of atrial fibrillation    Personal history of other diseases of the circulatory system     History of vascular disorder    Personal history of other diseases of the circulatory system     History of hypertension    Personal history of other diseases of the musculoskeletal system and connective tissue     History of arthritis    Personal history of other diseases of the respiratory system 04/17/2017    History of acute sinusitis    Personal history of other diseases of the respiratory system     History of chronic obstructive lung disease    Personal history of other endocrine, nutritional and metabolic disease     History of hyperlipidemia    Personal history of other medical treatment 09/08/2016    History of screening mammography    Personal history of other specified conditions 05/12/2020    History of right flank pain    Personal history of urinary (tract)  infections 08/30/2013    History of urinary tract infection    Right lower quadrant pain 05/12/2020    Acute abdominal pain in right lower quadrant    ST elevation (STEMI) myocardial infarction involving right coronary artery (Multi) 09/23/2019    ST elevation myocardial infarction involving right coronary artery    Unspecified abdominal pain 05/12/2020    Acute right flank pain    Unspecified nonsuppurative otitis media, unspecified ear     Fluid collection of middle ear        Current Medications  Current Outpatient Medications   Medication Instructions    albuterol 90 mcg/actuation inhaler 1-2 puffs, inhalation, Every 4 hours PRN    amLODIPine (Norvasc) 10 mg tablet 1 tablet, oral, Daily    amLODIPine (NORVASC) 5 mg, oral, Daily    ascorbic acid (Vitamin C) 500 mg tablet 1 tablet, oral, Daily    aspirin (Aspir-Low) 81 mg EC tablet 1 tablet, oral, Daily    CALCIUM-VIT D3-MAG GLY-ZINC ORAL 2 mL, oral, Daily RT    cholecalciferol (Vitamin D-3) 50 mcg (2,000 unit) capsule 2 capsules, oral, Daily RT    fish oil concentrate (Omega-3) 120-180 mg capsule 1 capsule, oral, Daily    ipratropium (Atrovent) 42 mcg (0.06 %) nasal spray 1 spray, Each Nostril, 3 times daily    LORazepam (Ativan) 0.5 mg tablet TAKE 1 TABLET BY MOUTH AS NEEDED AT BEDTIME FOR  INSOMNIA    metoprolol succinate XL (TOPROL-XL) 12.5 mg, oral, Daily RT    multivitamin tablet 1 tablet, oral, Daily    nitroglycerin (Nitrostat) 0.4 mg SL tablet 1 tablet, sublingual, Every 5 min PRN    omega-3s-dha-epa-fish oil (Sea-Omega) 200 mg-300 mg- 100 mg-1,000 mg capsule 1 capsule, oral, Daily    rosuvastatin (Crestor) 5 mg tablet Take 1 tablet by mouth once daily    sertraline (ZOLOFT) 50 mg, oral, Daily, as directed    vit A,C and E-lutein-minerals (Ocuvite with Lutein) 300 mcg-200 mg-27 mg-2 mg tablet 1 tablet, oral, Daily        Allergies  Allergies   Allergen Reactions    Apixaban Other    Atorvastatin Other     JOINT PAIN    Codeine Dizziness and Other     Rivaroxaban GI bleeding        Immunizations  Immunization History   Administered Date(s) Administered    Flu vaccine, quadrivalent, high-dose, preservative free, age 65y+ (FLUZONE) 2020, 2021, 10/04/2022    Influenza, High Dose Seasonal, Preservative Free 2015, 10/05/2016, 2017, 2019, 2021    Influenza, Unspecified 10/04/2022    Influenza, seasonal, injectable 2018    Influenza, trivalent, adjuvanted 2018    Novel influenza-H1N1-09, preservative-free 2009    Pfizer Purple Cap SARS-CoV-2 2021, 2021, 12/10/2021    Pneumococcal conjugate vaccine, 13-valent (PREVNAR 13) 2015    Pneumococcal polysaccharide vaccine, 23-valent, age 2 years and older (PNEUMOVAX 23) 10/13/2006    Zoster vaccine, recombinant, adult (SHINGRIX) 2021, 2021    Zoster, live 01/10/2008        Past Surgical History:   Procedure Laterality Date    BUNIONECTOMY  2016    Simple Bunion Exostectomy ([facility] Procedure)     SECTION, CLASSIC  10/17/2014     Section    CT ABDOMEN PELVIS ANGIOGRAM W AND/OR WO IV CONTRAST  2022    CT ABDOMEN PELVIS ANGIOGRAM W AND/OR WO IV CONTRAST 2022 [facility]    EYE SURGERY  10/17/2014    Eye Surgery    HAND TENDON SURGERY  2018    Hand Incision Tendon Sheath Of A Finger    HYSTERECTOMY  10/17/2014    Hysterectomy    OTHER SURGICAL HISTORY  2019    Small bowel resection    TONSILLECTOMY  10/17/2014    Tonsillectomy     Family History   Problem Relation Name Age of Onset    Colon cancer Mother      Ovarian cancer Mother      Other (Malignant neoplasm) Father      Hypothyroidism Daughter      Hypertension Son      Hypothyroidism Son      Heart attack Son      Stroke Other Unknown family member     Hearing loss Other Unknown family member     Heart disease Other Unknown family member      Social History     Tobacco Use    Smoking status: Some Days     Types: Cigarettes     Passive exposure:  Current    Smokeless tobacco: Never   Vaping Use    Vaping status: Never Used   Substance Use Topics    Alcohol use: Yes     Tobacco Use: High Risk (8/16/2023)    Patient History     Smoking Tobacco Use: Some Days     Smokeless Tobacco Use: Never     Passive Exposure: Current        ROS  All pertinent positive symptoms are included in the history of present illness.  All other systems have been reviewed and are negative and noncontributory to this patient's current ailments.    VITAL SIGNS  There were no vitals filed for this visit.  There were no vitals filed for this visit.   There is no height or weight on file to calculate BMI.     PHYSICAL EXAM  GENERAL APPEARANCE: well nourished, well developed, looks like stated age, in no acute distress, not ill or tired appearing, conversing well.   HEENT: no trauma, normocephalic. PERRLA and EOMI with normal external exam. TM's intact with no injection or effusion, no signs of infection. Nares patent, turbinates pink without discharge. Pharynx pink with no exudates or lesions, no enlarged tonsils.   NECK: no nodes, supple without rigidity, no neck mass was observed, no thyromegaly or thyroid nodules.   HEART: regular rate and rhythm, S1 and S2 heard with no murmurs or skipped beats  LUNGS: clear to auscultation bilaterally with no wheezes, crackles or rales.   ABDOMEN: no organomegaly, soft, nontender, nondistended, no guarding/rebound/rigidity.   EXTREMITIES: moving all extremities equally with no edema or deformities.   SKIN: normal skin color and pigmentation, normal skin turgor without rash, lesions, or nodules visualized.   NEUROLOGIC EXAM: CN II-XII grossly intact, normal gait, normal balance, 5/5 muscle strength, sensation grossly intact.   PSYCH: mood and affect appropriate; alert and oriented to time, place, person; no difficulty with speech or language.   LYMPH NODES: no cervical lymphadenopathy.         BREASTS: symmetrical, no masses / skin changes / dimpling /  nipple discharge. No tenderness to palpation. No axillary lymphadenopathy.    FEMALE GENITOURINARY: external genitalia without erythema, exudate or discharge. Vaginal vault is without discharge. Cervix is of normal color without lesion. The os is closed. There is no bleeding noted. Uterus is noted to be of normal size and nontender. No cervical motion tenderness is seen. No masses are palpated. The adnexa are without masses or tenderness.   [place]: MA was present during pelvic exam.    GENERAL APPEARANCE: well nourished, well developed, looks like stated age, in no acute distress, not ill or tired appearing, conversing well.   HEENT: no trauma, normocephalic.   NECK: supple without rigidity, no neck mass was observed.   LUNGS: good chest wall expansion. In no acute respiratory distress.   EXTREMITIES: moving all extremities equally with no edema.   SKIN: normal skin color and pigmentation, without rash.   NEUROLOGIC EXAM: CN II-XII grossly intact, normal gait.   PSYCH: mood and affect appropriate; alert and oriented to time, place, person; no difficulty with speech or language.     Preventative Services reviewed with patient and copy printed for patient.    Assessment/Plan   {Assess/[email or web address]:[id number]}    Additional Visit Plans:  Notes:  PREVENTATIVE HEALTH SCREENINGS INCLUDED:  - Blood pressure screen.  - Blood work may include a cholesterol and diabetes screen if risk factors exist (overweight, high blood pressure etc); screening for sexually transmitted infections; a one time HIV screen for all individuals, and a one time Hepatitis C Virus screen for those born between 8796-0659.  - I encourage you to eat a low-fat, moderate-carbohydrate, low-calorie diet to maintain a normal BMI (under 25) to reduce heart disease, and risk for diabetes  - Moderate intensity exercise for 30 minutes 5 days per week is recommended  - Along with recommendations for nutrition and exercise discussed today helpful resources  recommended by the American Academy of Family Practice can be found at www.familydoctor.org or www.choosemyplate.gov    - Colon cancer screening for all ages 45-75 or 85 years old periodically depending on results.  - Cervical cancer screening (pap test) in women between 21-65 years old, periodically depending on results.  - Mammogram screening for breast cancer in women starting at 40-50 years and every 1-2 years.  - For men and women who have a 30 pack year smoking history and currently smoke or have quit in the past 15 years, screening for lung cancer with a yearly low dose CT scan is recommended starting at age 55 until age 80 years  - For men only who have smoked 100+ cigarettes anytime in their lifetime, a one time ultrasound screening for for abdominal aortic aneurysms starting at age 65 until 75 years old  - Bone density screening (DEXA) for osteoporosis in women aged 65 years and older, once every two years if needed.    IMMUNIZATIONS:  - Flu shot annually.  - Tetanus booster every 10 years.  - Two pneumococcal vaccinations after 65 years old.  - Shingles vaccine for those 50 years or older.     This was a shared decision making visit.    Next Wellness Exam Due  In 1 year from today    Counseling:       Medication education:         Education:  The patient is counseled regarding potential side-effects of all new medications        Understanding:  Patient expressed understanding        Adherence:  No barriers to adherence identified    ** Please excuse any errors in grammar or translation related to this dictation. Voice recognition software was utilized to prepare this document. **

## 2024-06-06 NOTE — TELEPHONE ENCOUNTER
Noted.  Thanks for having that adjusted so we can keep patient on track with appropriate timeline for her Medicare well.  She can reach out to her blood work prior to appointment, typically recommending to contact office at least 2 weeks before.  Depending on refill needs, will hopefully be able to see about coverage though she may need to contact previous PCP depending on nature of medication as she has never been seen by us before

## 2024-06-06 NOTE — TELEPHONE ENCOUNTER
Pt rescheduled to 8/19 for Medicare Wellness. Pt was stating she may need blood work and refills before the appt.

## 2024-06-18 DIAGNOSIS — F51.01 PRIMARY INSOMNIA: ICD-10-CM

## 2024-06-18 RX ORDER — LORAZEPAM 0.5 MG/1
TABLET ORAL
Qty: 90 TABLET | Refills: 0 | Status: SHIPPED | OUTPATIENT
Start: 2024-06-18

## 2024-08-19 ENCOUNTER — APPOINTMENT (OUTPATIENT)
Dept: PRIMARY CARE | Facility: CLINIC | Age: 84
End: 2024-08-19
Payer: MEDICARE

## 2024-08-30 ENCOUNTER — APPOINTMENT (OUTPATIENT)
Dept: PRIMARY CARE | Facility: CLINIC | Age: 84
End: 2024-08-30
Payer: MEDICARE

## 2024-09-03 ENCOUNTER — APPOINTMENT (OUTPATIENT)
Dept: CARDIOLOGY | Facility: CLINIC | Age: 84
End: 2024-09-03
Payer: MEDICARE

## 2024-09-17 DIAGNOSIS — R06.02 SHORTNESS OF BREATH: ICD-10-CM

## 2024-09-17 RX ORDER — ALBUTEROL SULFATE 90 UG/1
2 INHALANT RESPIRATORY (INHALATION) EVERY 4 HOURS PRN
Qty: 8 G | Refills: 11 | Status: SHIPPED | OUTPATIENT
Start: 2024-09-17 | End: 2025-09-17

## 2024-09-20 ENCOUNTER — OFFICE VISIT (OUTPATIENT)
Dept: CARDIOLOGY | Facility: HOSPITAL | Age: 84
End: 2024-09-20
Payer: MEDICARE

## 2024-09-20 VITALS
HEART RATE: 66 BPM | WEIGHT: 106.7 LBS | SYSTOLIC BLOOD PRESSURE: 162 MMHG | OXYGEN SATURATION: 97 % | BODY MASS INDEX: 20.5 KG/M2 | DIASTOLIC BLOOD PRESSURE: 73 MMHG

## 2024-09-20 DIAGNOSIS — R00.2 HEART PALPITATIONS: Primary | ICD-10-CM

## 2024-09-20 DIAGNOSIS — I25.10 CORONARY ARTERY DISEASE INVOLVING NATIVE CORONARY ARTERY OF NATIVE HEART WITHOUT ANGINA PECTORIS: ICD-10-CM

## 2024-09-20 DIAGNOSIS — I10 HYPERTENSION, UNSPECIFIED TYPE: ICD-10-CM

## 2024-09-20 DIAGNOSIS — E78.5 HYPERLIPIDEMIA, UNSPECIFIED HYPERLIPIDEMIA TYPE: ICD-10-CM

## 2024-09-20 LAB
ATRIAL RATE: 61 BPM
P AXIS: 75 DEGREES
P OFFSET: 202 MS
P ONSET: 149 MS
PR INTERVAL: 148 MS
Q ONSET: 223 MS
QRS COUNT: 10 BEATS
QRS DURATION: 74 MS
QT INTERVAL: 406 MS
QTC CALCULATION(BAZETT): 408 MS
QTC FREDERICIA: 408 MS
R AXIS: 78 DEGREES
T AXIS: 73 DEGREES
T OFFSET: 426 MS
VENTRICULAR RATE: 61 BPM

## 2024-09-20 PROCEDURE — 93005 ELECTROCARDIOGRAM TRACING: CPT | Performed by: NURSE PRACTITIONER

## 2024-09-20 PROCEDURE — 99213 OFFICE O/P EST LOW 20 MIN: CPT | Performed by: NURSE PRACTITIONER

## 2024-09-20 PROCEDURE — 1159F MED LIST DOCD IN RCRD: CPT | Performed by: NURSE PRACTITIONER

## 2024-09-20 PROCEDURE — 3077F SYST BP >= 140 MM HG: CPT | Performed by: NURSE PRACTITIONER

## 2024-09-20 PROCEDURE — 3078F DIAST BP <80 MM HG: CPT | Performed by: NURSE PRACTITIONER

## 2024-09-20 PROCEDURE — G2211 COMPLEX E/M VISIT ADD ON: HCPCS | Performed by: NURSE PRACTITIONER

## 2024-09-20 ASSESSMENT — ENCOUNTER SYMPTOMS
GASTROINTESTINAL NEGATIVE: 1
HEMATOLOGIC/LYMPHATIC NEGATIVE: 1
PALPITATIONS: 1
RESPIRATORY NEGATIVE: 1
CONSTITUTIONAL NEGATIVE: 1
NEUROLOGICAL NEGATIVE: 1
PSYCHIATRIC NEGATIVE: 1
EYES NEGATIVE: 1
ENDOCRINE NEGATIVE: 1
MYALGIAS: 1

## 2024-09-20 NOTE — PROGRESS NOTES
Referred by Dr. Dawson for Palpitations (Per patient sx began 3 wks ago.)     History Of Present Illness:    Marli Duckworth is a very pleasant 84 year old female with a history of PAF, HTN and HLD, she is here for a follow up visit. The patient is seen in collaboration with Dr. Barger. Mrs. Duckworth complains of heart fluttering started a few weeks ago. Symptoms come and go and are brief in nature. Denies chest pain or shortness of breath. Does not limit activities.     Review of Systems   Constitutional: Negative.   HENT: Negative.     Eyes: Negative.    Cardiovascular:  Positive for palpitations.   Respiratory: Negative.     Endocrine: Negative.    Hematologic/Lymphatic: Negative.    Skin: Negative.    Musculoskeletal:  Positive for myalgias.   Gastrointestinal: Negative.    Neurological: Negative.    Psychiatric/Behavioral: Negative.        Past Medical History:  She has a past medical history of Acute bronchitis due to other specified organisms (11/27/2016), Acute upper respiratory infection, unspecified, Bronchitis, acute, with bronchospasm (02/19/2023), Cough (02/19/2023), Nasal congestion (02/19/2023), Neck pain (02/19/2023), Old myocardial infarction (08/22/2019), Personal history of other diseases of the circulatory system (06/07/2016), Personal history of other diseases of the circulatory system, Personal history of other diseases of the circulatory system, Personal history of other diseases of the musculoskeletal system and connective tissue, Personal history of other diseases of the respiratory system (04/17/2017), Personal history of other diseases of the respiratory system, Personal history of other endocrine, nutritional and metabolic disease, Personal history of other medical treatment (09/08/2016), Personal history of other specified conditions (05/12/2020), Personal history of urinary (tract) infections (08/30/2013), Right lower quadrant pain (05/12/2020), ST elevation (STEMI) myocardial  infarction involving right coronary artery (Multi) (2019), Unspecified abdominal pain (2020), and Unspecified nonsuppurative otitis media, unspecified ear.    Past Surgical History:  She has a past surgical history that includes Hysterectomy (10/17/2014); Eye surgery (10/17/2014); Tonsillectomy (10/17/2014);  section, classic (10/17/2014); Other surgical history (2019); Bunionectomy (2016); Hand tendon surgery (2018); and CT angio abdomen pelvis w and or wo IV IV contrast (2022).      Social History:  She reports that she has been smoking cigarettes. She has been exposed to tobacco smoke. She has never used smokeless tobacco. She reports current alcohol use. No history on file for drug use.    Family History:  Family History   Problem Relation Name Age of Onset    Colon cancer Mother      Ovarian cancer Mother      Other (Malignant neoplasm) Father      Hypothyroidism Daughter      Hypertension Son      Hypothyroidism Son      Heart attack Son      Stroke Other Unknown family member     Hearing loss Other Unknown family member     Heart disease Other Unknown family member         Allergies:  Apixaban, Atorvastatin, Codeine, and Rivaroxaban    Outpatient Medications:  Current Outpatient Medications   Medication Instructions    albuterol 90 mcg/actuation inhaler 2 puffs, inhalation, Every 4 hours PRN    amLODIPine (NORVASC) 5 mg, oral, Daily    ascorbic acid (Vitamin C) 500 mg tablet 1 tablet, oral, Daily    aspirin (Aspir-Low) 81 mg EC tablet 1 tablet, oral, Daily    CALCIUM-VIT D3-MAG GLY-ZINC ORAL 2 mL, oral, Daily RT    cholecalciferol (Vitamin D-3) 50 mcg (2,000 unit) capsule 2 capsules, oral, Daily RT    fish oil concentrate (Omega-3) 120-180 mg capsule 1 capsule, oral, Daily    ipratropium (Atrovent) 42 mcg (0.06 %) nasal spray 1 spray, Each Nostril, 3 times daily    LORazepam (Ativan) 0.5 mg tablet TAKE 1 TABLET BY MOUTH AS NEEDED AT BEDTIME FOR INSOMNIA    metoprolol  succinate XL (TOPROL-XL) 12.5 mg, oral, Daily RT    multivitamin tablet 1 tablet, oral, Daily    nitroglycerin (Nitrostat) 0.4 mg SL tablet 1 tablet, sublingual, Every 5 min PRN    omega-3s-dha-epa-fish oil (Sea-Omega) 200 mg-300 mg- 100 mg-1,000 mg capsule 1 capsule, oral, Daily    rosuvastatin (Crestor) 5 mg tablet Take 1 tablet by mouth once daily    sertraline (ZOLOFT) 50 mg, oral, Daily, as directed    vit A,C and E-lutein-minerals (Ocuvite with Lutein) 300 mcg-200 mg-27 mg-2 mg tablet 1 tablet, oral, Daily        Last Recorded Vitals:  Vitals:    09/20/24 1043   BP: 162/73   Pulse: 66   SpO2: 97%   Weight: 48.4 kg (106 lb 11.2 oz)       Physical Exam:  Physical Exam  Vitals reviewed.   HENT:      Head: Normocephalic.      Nose: Nose normal.   Eyes:      Pupils: Pupils are equal, round, and reactive to light.   Cardiovascular:      Rate and Rhythm: Normal rate and regular rhythm.   Pulmonary:      Effort: Pulmonary effort is normal.      Breath sounds: Normal breath sounds.   Abdominal:      General: Abdomen is flat.      Palpations: Abdomen is soft.   Musculoskeletal:         General: Normal range of motion.      Cervical back: Normal range of motion.   Skin:     General: Skin is warm and dry.   Neurological:      General: No focal deficit present.      Mental Status: He is alert and oriented to person, place, and time.   Psychiatric:         Mood and Affect: Mood normal.            Last Labs:  CBC -  Lab Results   Component Value Date    WBC 7.7 08/17/2023    HGB 14.4 08/17/2023    HCT 44.9 08/17/2023    MCV 97 08/17/2023     08/17/2023       CMP -  Lab Results   Component Value Date    CALCIUM 9.2 08/17/2023    PHOS 4.0 06/27/2022    PROT 6.3 (L) 08/17/2023    ALBUMIN 4.2 08/17/2023    AST 20 08/17/2023    ALT 13 08/17/2023    ALKPHOS 54 08/17/2023    BILITOT 0.6 08/17/2023       LIPID PANEL -   Lab Results   Component Value Date    CHOL 107 08/17/2023    TRIG 175 (H) 08/17/2023    HDL 37.4 (A)  08/17/2023    CHHDL 2.9 08/17/2023    LDLF 35 08/17/2023    VLDL 35 08/17/2023       RENAL FUNCTION PANEL -   Lab Results   Component Value Date    GLUCOSE 78 08/17/2023     08/17/2023    K 4.1 08/17/2023     08/17/2023    CO2 30 08/17/2023    ANIONGAP 13 08/17/2023    BUN 17 08/17/2023    CREATININE 0.88 08/17/2023    CALCIUM 9.2 08/17/2023    PHOS 4.0 06/27/2022    ALBUMIN 4.2 08/17/2023        Lab Results   Component Value Date    BNP 77 06/26/2022       Last Cardiology Tests:  ECG:  EKG independently reviewed from today showed sinus rhythm heart rate 61 bpm   Echo:  Echocardiogram 8/14/2019   1. The left ventricular systolic function is mildly to moderately decreased with  a 40% estimated ejection fraction.  2. Basal and mid inferior wall, basal and mid inferolateral wall, and basal  inferoseptal segment are abnormal.  3. Spectral Doppler shows an impaired relaxation pattern of left ventricular  diastolic filling.  4. Mild to moderate mitral valve regurgitation  Ejection Fractions:  LVEF 52%  Cath:  Cardiac cath 8/14/2019   1. Inferior STEMI s/p successful PCI with overlapping DONNY x2 to the RCA.  2. Mid LCx 100% .  3. Mild to moderate diffuse LAD disease, with R 65% mid LAD focal stenosis.  4. No significant LV-AO peak to peak pullback gradient.  5. Left Ventricular end-diastolic pressure = 21.  Stress Test:  Nuclear Stress Test 05/31/2022  1. Interval improved appearance of moderate territory of mild to  moderate reversibility involving the lateral wall when compared with  2019 MPI, corresponding to an area of chronic total occlusion in the  mid circumflex with collaterals, as demonstrated on prior  catheterization, noting that ischemia does remain.  2. However, there is also the suggestion of transient ischemic  dilatation with stress and post-stress decrease in LVEF, which raises  the concern for balanced ischemia. Please correlate clinically.  3. The left ventricle is normal in size.  4.  Normal LVEF at rest, estimated at 62%, decreases to 52% at  post-stress.    Nuclear stress test 8/27/2019   1. Moderate-sized territory of moderate severity ischemia involving  the basal through distal lateral wall which fully normalizes at rest  without evidence of significant prior infarction in this territory.  2. Grossly normal perfusion throughout the remainder of the left  ventricle without evidence of additional territory of ischemia or a  territory of prior infarction.  3. The left ventricle is normal in size.  4. There is mild LV lateral wall hypokinesis with an LV EF estimated  at 45%.    Cardiac Imaging:      Assessment/Plan   Mrs. Duckworth is a very pleasant 84 year old female with a history of PAF, HTN and HLD, she was admitted in 8/2019 with a STEMI with a DONNY x 2 to the RCA, she complains of heart palpitations over the past three weeks. She will have a monitor to assess for an arrhythmia. Heart rate and blood pressure are well controlled today.      Plan   -call with any questions   -Heart monitor for two weeks   -will call to review the results  -continue Aspirin indefinitely   -continue Amlodipine 5 mg daily   -continue Metoprolol   -continue Crestor three times a week       Anika Fisher, APRN-CNP

## 2024-09-20 NOTE — PATIENT INSTRUCTIONS
CALL WITH ANY QUESTIONS   HEART MONITOR FOR TWO WEEKS   WILL CALL TO REVIEW THE RESULTS   NO MEDICATION CHANGES

## 2024-09-28 ENCOUNTER — OFFICE VISIT (OUTPATIENT)
Dept: URGENT CARE | Age: 84
End: 2024-09-28
Payer: MEDICARE

## 2024-09-28 VITALS
WEIGHT: 106 LBS | HEART RATE: 57 BPM | DIASTOLIC BLOOD PRESSURE: 67 MMHG | BODY MASS INDEX: 20.36 KG/M2 | TEMPERATURE: 98.1 F | OXYGEN SATURATION: 95 % | SYSTOLIC BLOOD PRESSURE: 173 MMHG

## 2024-09-28 DIAGNOSIS — R39.15 URINARY URGENCY: Primary | ICD-10-CM

## 2024-09-28 DIAGNOSIS — R30.0 DYSURIA: ICD-10-CM

## 2024-09-28 LAB
POC APPEARANCE, URINE: CLEAR
POC BILIRUBIN, URINE: NEGATIVE
POC BLOOD, URINE: NEGATIVE
POC COLOR, URINE: YELLOW
POC GLUCOSE, URINE: NEGATIVE MG/DL
POC KETONES, URINE: NEGATIVE MG/DL
POC LEUKOCYTES, URINE: NEGATIVE
POC NITRITE,URINE: NEGATIVE
POC PH, URINE: 6 PH
POC PROTEIN, URINE: NEGATIVE MG/DL
POC SPECIFIC GRAVITY, URINE: <=1.005
POC UROBILINOGEN, URINE: 0.2 EU/DL

## 2024-09-28 PROCEDURE — 87186 SC STD MICRODIL/AGAR DIL: CPT

## 2024-09-28 PROCEDURE — 87086 URINE CULTURE/COLONY COUNT: CPT

## 2024-09-28 RX ORDER — NITROFURANTOIN 25; 75 MG/1; MG/1
100 CAPSULE ORAL 2 TIMES DAILY
Qty: 10 CAPSULE | Refills: 0 | Status: SHIPPED | OUTPATIENT
Start: 2024-09-28 | End: 2024-10-03

## 2024-09-28 ASSESSMENT — ENCOUNTER SYMPTOMS: DYSURIA: 1

## 2024-09-28 NOTE — PROGRESS NOTES
Subjective   Patient ID: Marli Duckworth is a 84 y.o. female. They present today with a chief complaint of Urinary Urgency (X3 days).    History of Present Illness  Patient reports dysuria , frequency for 2 days.  Denies fever, chills, or fatigue  Denies CVA or flank pain.           Past Medical History  Allergies as of 09/28/2024 - Reviewed 09/28/2024   Allergen Reaction Noted    Apixaban Other 02/19/2023    Atorvastatin Other 02/19/2023    Codeine Dizziness and Other 02/19/2023    Rivaroxaban GI bleeding 02/19/2023       (Not in a hospital admission)       Past Medical History:   Diagnosis Date    Acute bronchitis due to other specified organisms 11/27/2016    Acute bacterial bronchitis    Acute upper respiratory infection, unspecified     Acute URI    Bronchitis, acute, with bronchospasm 02/19/2023    Cough 02/19/2023    Nasal congestion 02/19/2023    Neck pain 02/19/2023    Old myocardial infarction 08/22/2019    History of myocardial infarction    Personal history of other diseases of the circulatory system 06/07/2016    History of atrial fibrillation    Personal history of other diseases of the circulatory system     History of vascular disorder    Personal history of other diseases of the circulatory system     History of hypertension    Personal history of other diseases of the musculoskeletal system and connective tissue     History of arthritis    Personal history of other diseases of the respiratory system 04/17/2017    History of acute sinusitis    Personal history of other diseases of the respiratory system     History of chronic obstructive lung disease    Personal history of other endocrine, nutritional and metabolic disease     History of hyperlipidemia    Personal history of other medical treatment 09/08/2016    History of screening mammography    Personal history of other specified conditions 05/12/2020    History of right flank pain    Personal history of urinary (tract) infections 08/30/2013     History of urinary tract infection    Right lower quadrant pain 2020    Acute abdominal pain in right lower quadrant    ST elevation (STEMI) myocardial infarction involving right coronary artery (Multi) 2019    ST elevation myocardial infarction involving right coronary artery    Unspecified abdominal pain 2020    Acute right flank pain    Unspecified nonsuppurative otitis media, unspecified ear     Fluid collection of middle ear       Past Surgical History:   Procedure Laterality Date    BUNIONECTOMY  2016    Simple Bunion Exostectomy ([facility])     SECTION, CLASSIC  10/17/2014     Section    CT ABDOMEN PELVIS ANGIOGRAM W AND/OR WO IV CONTRAST  2022    CT ABDOMEN PELVIS ANGIOGRAM W AND/OR WO IV CONTRAST 2022 [facility]    EYE SURGERY  10/17/2014    Eye Surgery    HAND TENDON SURGERY  2018    Hand Incision Tendon Sheath Of A Finger    HYSTERECTOMY  10/17/2014    Hysterectomy    OTHER SURGICAL HISTORY  2019    Small bowel resection    TONSILLECTOMY  10/17/2014    Tonsillectomy        reports that she has been smoking cigarettes. She has been exposed to tobacco smoke. She has never used smokeless tobacco. She reports current alcohol use.    Review of Systems  Review of Systems   Genitourinary:  Positive for dysuria and urgency.   All other systems reviewed and are negative.                                 Objective    Vitals:    24 1111   BP: 173/67   BP Location: Left arm   Patient Position: Sitting   BP Cuff Size: Adult   Pulse: 57   Temp: 36.7 °C (98.1 °F)   TempSrc: Oral   SpO2: 95%   Weight: 48.1 kg (106 lb)     No LMP recorded.    Physical Exam  Constitutional:       Appearance: Normal appearance.   HENT:      Head: Normocephalic.      Nose: Nose normal.   Cardiovascular:      Rate and Rhythm: Normal rate and regular rhythm.      Pulses: Normal pulses.      Heart sounds: Normal heart sounds.   Pulmonary:      Effort: Pulmonary  effort is normal.      Breath sounds: Normal breath sounds.   Abdominal:      General: Abdomen is flat. Bowel sounds are normal.      Palpations: Abdomen is soft.   Musculoskeletal:         General: Normal range of motion.      Cervical back: Normal range of motion.   Skin:     General: Skin is warm and dry.      Capillary Refill: Capillary refill takes less than 2 seconds.   Neurological:      General: No focal deficit present.      Mental Status: She is alert and oriented to person, place, and time.   Psychiatric:         Mood and Affect: Mood normal.         Behavior: Behavior normal.         Procedures    Point of Care Test & Imaging Results from this visit  Results for orders placed or performed in visit on 09/28/24   POCT UA Automated manually resulted   Result Value Ref Range    POC Color, Urine Yellow Straw, Yellow, Light-Yellow    POC Appearance, Urine Clear Clear    POC Glucose, Urine NEGATIVE NEGATIVE mg/dl    POC Bilirubin, Urine NEGATIVE NEGATIVE    POC Ketones, Urine NEGATIVE NEGATIVE mg/dl    POC Specific Gravity, Urine <=1.005 1.005 - 1.035    POC Blood, Urine NEGATIVE NEGATIVE    POC PH, Urine 6.0 No Reference Range Established PH    POC Protein, Urine NEGATIVE NEGATIVE, 30 (1+) mg/dl    POC Urobilinogen, Urine 0.2 0.2, 1.0 EU/DL    Poc Nitrite, Urine NEGATIVE NEGATIVE    POC Leukocytes, Urine NEGATIVE NEGATIVE      No results found.    Diagnostic study results (if any) were reviewed by DOROTHY Rivero.    Assessment/Plan   Allergies, medications, history, and pertinent labs/EKGs/Imaging reviewed by DOROTHY Rivero.     Medical Decision Making    Patient in NAD, VSS, HRR, lungs clear  Reports dysuria , frequency for 2 days  No CVA or flank pain, no back pain.  UA WNL  Will send culture, due to presentation will treat empirically with Macrobid as directed, go to ED with worsening symptoms, patient agrees with plan of care.    Orders and Diagnoses  Diagnoses and all  orders for this visit:  Urinary urgency  -     POCT UA Automated manually resulted      Medical Admin Record      Patient disposition: Home    Electronically signed by DOROTHY Rivero  11:38 AM

## 2024-09-30 ENCOUNTER — HOSPITAL ENCOUNTER (OUTPATIENT)
Dept: CARDIOLOGY | Facility: HOSPITAL | Age: 84
Discharge: HOME | End: 2024-09-30
Payer: MEDICARE

## 2024-09-30 DIAGNOSIS — R00.2 HEART PALPITATIONS: ICD-10-CM

## 2024-09-30 PROCEDURE — 93246 EXT ECG>7D<15D RECORDING: CPT

## 2024-10-01 LAB — BACTERIA UR CULT: ABNORMAL

## 2024-10-17 ENCOUNTER — TELEPHONE (OUTPATIENT)
Dept: CARDIOLOGY | Facility: HOSPITAL | Age: 84
End: 2024-10-17
Payer: MEDICARE

## 2024-11-06 NOTE — PROGRESS NOTES
[facility] Heart and Vascular Electrophysiology    Patient Name: Marli Duckworth  Patient : 1940    Referred for  pAFib    History of Present Illness:  Marli Duckworth is a 84 y.o. year old female patient with:    Paroxysmal Atrial Fibrillation not on OAC due to history of GIB followed by patient declining to restart OAC after cleared.  Nonsustained SVT  HTN  HLD    Patient was referred to EP due to abnormal monitor.  Her monitor showed sinus rhythm with heart rates 47- bpm.  She had 4 episodes of NSVT lasting up to 8 beats and 60 episodes of nonsustained SVT lasting up to 13 seconds.  PVCs are rare 1.2%.  The patient reports episodes of palpitations lasting few seconds.  She denies episodes of near syncope or syncope.  Her EKG today shows sinus rhythm with PVCs heart rate 65 bpm.    Past Medical History:  She has a past medical history of Acute bronchitis due to other specified organisms (2016), Acute upper respiratory infection, unspecified, Bronchitis, acute, with bronchospasm (2023), Cough (2023), Nasal congestion (2023), Neck pain (2023), Old myocardial infarction (2019), Personal history of other diseases of the circulatory system (2016), Personal history of other diseases of the circulatory system, Personal history of other diseases of the circulatory system, Personal history of other diseases of the musculoskeletal system and connective tissue, Personal history of other diseases of the respiratory system (2017), Personal history of other diseases of the respiratory system, Personal history of other endocrine, nutritional and metabolic disease, Personal history of other medical treatment (2016), Personal history of other specified conditions (2020), Personal history of urinary (tract) infections (2013), Right lower quadrant pain (2020), ST elevation (STEMI) myocardial infarction involving right coronary artery (Multi)  (2019), Unspecified abdominal pain (2020), and Unspecified nonsuppurative otitis media, unspecified ear.    Past Surgical History:  She has a past surgical history that includes Hysterectomy (10/17/2014); Eye surgery (10/17/2014); Tonsillectomy (10/17/2014);  section, classic (10/17/2014); Other surgical history (2019); Bunionectomy (2016); Hand tendon surgery (2018); and CT angio abdomen pelvis w and or wo IV IV contrast (2022).      Social History:  She reports that she has been smoking cigarettes. She has been exposed to tobacco smoke. She has never used smokeless tobacco. She reports current alcohol use. No history on file for drug use.    Family History:  Family History   Problem Relation Name Age of Onset    Colon cancer Mother      Ovarian cancer Mother      Other (Malignant neoplasm) Father      Hypothyroidism Daughter      Hypertension Son      Hypothyroidism Son      Heart attack Son      Stroke Other Unknown family member     Hearing loss Other Unknown family member     Heart disease Other Unknown family member         Allergies:  Apixaban, Atorvastatin, Codeine, and Rivaroxaban    Outpatient Medications:  Current Outpatient Medications   Medication Instructions    albuterol 90 mcg/actuation inhaler 2 puffs, inhalation, Every 4 hours PRN    amLODIPine (NORVASC) 5 mg, oral, Daily    ascorbic acid (Vitamin C) 500 mg tablet 1 tablet, oral, Daily    aspirin (Aspir-Low) 81 mg EC tablet 1 tablet, oral, Daily    CALCIUM-VIT D3-MAG GLY-ZINC ORAL 2 mL, oral, Daily RT    cholecalciferol (Vitamin D-3) 50 mcg (2,000 unit) capsule 2 capsules, oral, Daily RT    fish oil concentrate (Omega-3) 120-180 mg capsule 1 capsule, oral, Daily    ipratropium (Atrovent) 42 mcg (0.06 %) nasal spray 1 spray, Each Nostril, 3 times daily    LORazepam (Ativan) 0.5 mg tablet TAKE 1 TABLET BY MOUTH AS NEEDED AT BEDTIME FOR INSOMNIA    metoprolol succinate XL (TOPROL-XL) 12.5 mg, oral, Daily RT     multivitamin tablet 1 tablet, oral, Daily    nitroglycerin (Nitrostat) 0.4 mg SL tablet 1 tablet, sublingual, Every 5 min PRN    omega-3s-dha-epa-fish oil (Sea-Omega) 200 mg-300 mg- 100 mg-1,000 mg capsule 1 capsule, oral, Daily    rosuvastatin (Crestor) 5 mg tablet Take 1 tablet by mouth once daily    sertraline (ZOLOFT) 50 mg, oral, Daily, as directed    vit A,C and E-lutein-minerals (Ocuvite with Lutein) 300 mcg-200 mg-27 mg-2 mg tablet 1 tablet, oral, Daily        ROS:  A 14 point review of systems was done and is negative other than as stated in HPI    Physical Exam  Constitutional:       Appearance: Normal appearance.   Cardiovascular:      Rate and Rhythm: Normal rate and regular rhythm.      Heart sounds: No murmur heard.     No friction rub. No gallop.   Pulmonary:      Effort: Pulmonary effort is normal.      Breath sounds: Normal breath sounds.   Abdominal:      Palpations: Abdomen is soft.   Musculoskeletal:      Cervical back: Neck supple.   Neurological:      Mental Status: She is alert.   Psychiatric:         Mood and Affect: Mood normal.         Behavior: Behavior normal.          Vitals:  There were no vitals taken for this visit.       Labs:   CBC  Lab Results   Component Value Date    WBC 7.7 08/17/2023    HGB 14.4 08/17/2023    HCT 44.9 08/17/2023    MCV 97 08/17/2023     08/17/2023        Renal Function Panel  Lab Results   Component Value Date    GLUCOSE 78 08/17/2023     08/17/2023    K 4.1 08/17/2023     08/17/2023    CO2 30 08/17/2023    ANIONGAP 13 08/17/2023    BUN 17 08/17/2023    CREATININE 0.88 08/17/2023    GFRF 65 08/17/2023    CALCIUM 9.2 08/17/2023        CMP  Lab Results   Component Value Date    CALCIUM 9.2 08/17/2023    PHOS 4.0 06/27/2022    PROT 6.3 (L) 08/17/2023    ALBUMIN 4.2 08/17/2023    AST 20 08/17/2023    ALT 13 08/17/2023    ALKPHOS 54 08/17/2023    BILITOT 0.6 08/17/2023       TSH  Lab Results   Component Value Date    TSH 1.13 08/17/2023           Cardiac Testing:    Echocardiogram  08/14/2019  PHYSICIAN INTERPRETATION:  Left Ventricle: The left ventricular systolic function is mildly to moderately decreased, with an estimated ejection fraction of 40%. Wall motion is abnormal. The left ventricular cavity size is normal. Spectral Doppler shows an impaired relaxation pattern of left ventricular diastolic filling.  LV Wall Scoring:  The basal and mid inferior wall, basal and mid inferolateral wall, and basal  inferoseptal segment are hypokinetic.     Left Atrium: The left atrium is mildly dilated.  Right Ventricle: The right ventricle is normal in size. There is normal right ventricular global systolic function.  Right Atrium: The right atrium is normal in size.  Aortic Valve: The aortic valve is trileaflet. There is no evidence of aortic valve stenosis.  There is no evidence of aortic valve regurgitation. The peak instantaneous gradient of the aortic valve is 3.6 mmHg. The mean gradient of the aortic valve is 2.1 mmHg.  Mitral Valve: The mitral valve is normal in structure. There is mild to moderate mitral valve regurgitation.  Tricuspid Valve: The tricuspid valve is structurally normal. There is trace tricuspid regurgitation.  Pulmonic Valve: The pulmonic valve is not well visualized. The pulmonic valve regurgitation was not well visualized.  Pericardium: There is no pericardial effusion noted.  Aorta: The aortic root is normal.  Pulmonary Artery: The tricuspid regurgitant velocity is 1.50 m/s, and with an estimated right atrial pressure of 10 mmHg, the estimated pulmonary artery pressure is normal with the RVSP at 19.0 mmHg.  Systemic Veins: The inferior vena cava appears to be of normal size.        CONCLUSIONS:   1. The left ventricular systolic function is mildly to moderately decreased with a 40% estimated ejection fraction.   2. Basal and mid inferior wall, basal and mid inferolateral wall, and basal inferoseptal segment are abnormal.   3. Spectral  Doppler shows an impaired relaxation pattern of left ventricular diastolic filling.   4. Mild to moderate mitral valve regurgitation.    Holter Monitor  09/30/2024  Patient had a min HR of 47 bpm, max HR of 200 bpm, and avg HR of 66 bpm.  Predominant underlying rhythm was Sinus Rhythm. 4 Ventricular Tachycardia runs  occurred, the run with the fastest interval lasting 5 beats with a max rate of 190  bpm, the longest lasting 8 beats with an avg rate of 110 bpm. 60 Supraventricular  Tachycardia runs occurred, the run with the fastest interval lasting 7 beats with a  max rate of 200 bpm, the longest lasting 13.0 secs with an avg rate of 145 bpm.  Some episodes of Supraventricular Tachycardia may be possible Atrial Tachycardia  with variable block. Isolated SVEs were rare (<1.0%), SVE Couplets were rare  (<1.0%), and SVE Triplets were rare (<1.0%). Isolated VEs were occasional (1.2%,  22006), VE Couplets were rare (<1.0%, 26), and VE Triplets were rare (<1.0%, 4).  Ventricular Bigeminy and Trigeminy were present.        Assessment:     Patient was referred to EP due to abnormal monitor.  Her monitor showed sinus rhythm with heart rates 47- bpm.  She had 4 episodes of NSVT lasting up to 8 beats and 60 episodes of nonsustained SVT lasting up to 13 seconds.  PVCs are rare 1.2%.  The patient reports episodes of palpitations lasting few seconds.  She denies episodes of near syncope or syncope.  Her EKG today shows sinus rhythm with PVCs heart rate 65 bpm.  Will increase her metoprolol to 25 mg daily and schedule follow-up to reassess.

## 2024-11-18 ENCOUNTER — OFFICE VISIT (OUTPATIENT)
Dept: CARDIOLOGY | Facility: HOSPITAL | Age: 84
End: 2024-11-18
Payer: MEDICARE

## 2024-11-18 VITALS
DIASTOLIC BLOOD PRESSURE: 73 MMHG | SYSTOLIC BLOOD PRESSURE: 174 MMHG | OXYGEN SATURATION: 97 % | WEIGHT: 108.25 LBS | HEART RATE: 65 BPM | BODY MASS INDEX: 20.79 KG/M2

## 2024-11-18 DIAGNOSIS — I10 BENIGN ESSENTIAL HYPERTENSION: ICD-10-CM

## 2024-11-18 DIAGNOSIS — I47.10 NONSUSTAINED SUPRAVENTRICULAR TACHYCARDIA (CMS-HCC): Primary | ICD-10-CM

## 2024-11-18 LAB
ATRIAL RATE: 65 BPM
P AXIS: 72 DEGREES
P OFFSET: 199 MS
P ONSET: 147 MS
PR INTERVAL: 144 MS
Q ONSET: 219 MS
QRS COUNT: 11 BEATS
QRS DURATION: 78 MS
QT INTERVAL: 418 MS
QTC CALCULATION(BAZETT): 434 MS
QTC FREDERICIA: 429 MS
R AXIS: 76 DEGREES
T AXIS: 34 DEGREES
T OFFSET: 428 MS
VENTRICULAR RATE: 65 BPM

## 2024-11-18 PROCEDURE — 99214 OFFICE O/P EST MOD 30 MIN: CPT | Performed by: STUDENT IN AN ORGANIZED HEALTH CARE EDUCATION/TRAINING PROGRAM

## 2024-11-18 PROCEDURE — 3077F SYST BP >= 140 MM HG: CPT | Performed by: STUDENT IN AN ORGANIZED HEALTH CARE EDUCATION/TRAINING PROGRAM

## 2024-11-18 PROCEDURE — 3078F DIAST BP <80 MM HG: CPT | Performed by: STUDENT IN AN ORGANIZED HEALTH CARE EDUCATION/TRAINING PROGRAM

## 2024-11-18 PROCEDURE — 93005 ELECTROCARDIOGRAM TRACING: CPT | Performed by: STUDENT IN AN ORGANIZED HEALTH CARE EDUCATION/TRAINING PROGRAM

## 2024-11-18 PROCEDURE — 1159F MED LIST DOCD IN RCRD: CPT | Performed by: STUDENT IN AN ORGANIZED HEALTH CARE EDUCATION/TRAINING PROGRAM

## 2024-11-18 RX ORDER — METOPROLOL SUCCINATE 25 MG/1
25 TABLET, EXTENDED RELEASE ORAL DAILY
Qty: 90 TABLET | Refills: 3 | Status: SHIPPED | OUTPATIENT
Start: 2024-11-18 | End: 2025-11-18

## 2025-04-11 NOTE — PROGRESS NOTES
[facility] Heart [facility] Vascular Electrophysiology    Patient Name: Marli Duckworth  Patient : 1940    Referred for  pAfib    History of Present Illness:  Marli Duckworth is a 85 y.o. year old female patient with:    pAfib not on OAC due to history of GIB followed by patient declining to restart OAC after cleared.   NSSVT : Metoprolol was increased to 25mg daily at last visit.  HTN  HLD        Past Medical History:  She has a past medical history of Acute bronchitis due to other specified organisms (2016), Acute upper respiratory infection, unspecified, Bronchitis, acute, with bronchospasm (2023), Cough (2023), Nasal congestion (2023), Neck pain (2023), Old myocardial infarction (2019), Personal history of other diseases of the circulatory system (2016), Personal history of other diseases of the circulatory system, Personal history of other diseases of the circulatory system, Personal history of other diseases of the musculoskeletal system and connective tissue, Personal history of other diseases of the respiratory system (2017), Personal history of other diseases of the respiratory system, Personal history of other endocrine, nutritional and metabolic disease, Personal history of other medical treatment (2016), Personal history of other specified conditions (2020), Personal history of urinary (tract) infections (2013), Right lower quadrant pain (2020), ST elevation (STEMI) myocardial infarction involving right coronary artery (Multi) (2019), Unspecified abdominal pain (2020), and Unspecified nonsuppurative otitis media, unspecified ear.    Past Surgical History:  She has a past surgical history that includes Hysterectomy (10/17/2014); Eye surgery (10/17/2014); Tonsillectomy (10/17/2014);  section, classic (10/17/2014); Other surgical history (2019); Bunionectomy (2016); Hand tendon surgery (2018);  and CT angio abdomen pelvis w and or wo IV IV contrast (1/2/2022).      Social History:  She reports that she has been smoking cigarettes. She has been exposed to tobacco smoke. She has never used smokeless tobacco. She reports current alcohol use. No history on file for drug use.    Family History:  Family History   Problem Relation Name Age of Onset    Colon cancer Mother      Ovarian cancer Mother      Other (Malignant neoplasm) Father      Hypothyroidism Daughter      Hypertension Son      Hypothyroidism Son      Heart attack Son      Stroke Other Unknown family member     Hearing loss Other Unknown family member     Heart disease Other Unknown family member         Allergies:  Apixaban, Atorvastatin, Codeine, and Rivaroxaban    Outpatient Medications:  Current Outpatient Medications   Medication Instructions    albuterol 90 mcg/actuation inhaler 2 puffs, inhalation, Every 4 hours PRN    amLODIPine (NORVASC) 5 mg, oral, Daily    ascorbic acid (Vitamin C) 500 mg tablet 1 tablet, Daily    aspirin (Aspir-Low) 81 mg EC tablet 1 tablet, Daily    CALCIUM-VIT D3-MAG GLY-ZINC ORAL 2 mL, Daily RT    cholecalciferol (Vitamin D-3) 50 mcg (2,000 unit) capsule 2 capsules, Daily RT    fish oil concentrate (Omega-3) 120-180 mg capsule 1 capsule, Daily    ipratropium (Atrovent) 42 mcg (0.06 %) nasal spray 1 spray, 3 times daily    LORazepam (Ativan) 0.5 mg tablet TAKE 1 TABLET BY MOUTH AS NEEDED AT BEDTIME FOR INSOMNIA    metoprolol succinate XL (TOPROL-XL) 25 mg, oral, Daily, Do not crush or chew.    multivitamin tablet 1 tablet, Daily    nitroglycerin (Nitrostat) 0.4 mg SL tablet 1 tablet, Every 5 min PRN    omega-3s-dha-epa-fish oil (Sea-Omega) 200 mg-300 mg- 100 mg-1,000 mg capsule 1 capsule, Daily    rosuvastatin (Crestor) 5 mg tablet Take 1 tablet by mouth once daily    sertraline (ZOLOFT) 50 mg, oral, Daily, as directed    vit A,C and E-lutein-minerals (Ocuvite with Lutein) 300 mcg-200 mg-27 mg-2 mg tablet 1 tablet,  Daily        ROS:  A 14 point review of systems was done and is negative other than as stated in HPI    Physical Exam    Vitals:  There were no vitals taken for this visit.       Labs:   CBC  Lab Results   Component Value Date    WBC 7.7 08/17/2023    HGB 14.4 08/17/2023    HCT 44.9 08/17/2023    MCV 97 08/17/2023     08/17/2023        Renal Function Panel  Lab Results   Component Value Date    GLUCOSE 78 08/17/2023     08/17/2023    K 4.1 08/17/2023     08/17/2023    CO2 30 08/17/2023    ANIONGAP 13 08/17/2023    BUN 17 08/17/2023    CREATININE 0.88 08/17/2023    GFRF 65 08/17/2023    CALCIUM 9.2 08/17/2023        CMP  Lab Results   Component Value Date    CALCIUM 9.2 08/17/2023    PHOS 4.0 06/27/2022    PROT 6.3 (L) 08/17/2023    ALBUMIN 4.2 08/17/2023    AST 20 08/17/2023    ALT 13 08/17/2023    ALKPHOS 54 08/17/2023    BILITOT 0.6 08/17/2023       TSH  Lab Results   Component Value Date    TSH 1.13 08/17/2023          Cardiac Testing:  ECG  04/14/2025      Echocardiogram  08/14/2019  PHYSICIAN INTERPRETATION:  Left Ventricle: The left ventricular systolic function is mildly to moderately decreased, with an estimated ejection fraction of 40%. Wall motion is abnormal. The left ventricular cavity size is normal. Spectral Doppler shows an impaired relaxation pattern of left ventricular diastolic filling.  LV Wall Scoring:  The basal and mid inferior wall, basal and mid inferolateral wall, and basal  inferoseptal segment are hypokinetic.     Left Atrium: The left atrium is mildly dilated.  Right Ventricle: The right ventricle is normal in size. There is normal right ventricular global systolic function.  Right Atrium: The right atrium is normal in size.  Aortic Valve: The aortic valve is trileaflet. There is no evidence of aortic valve stenosis.  There is no evidence of aortic valve regurgitation. The peak instantaneous gradient of the aortic valve is 3.6 mmHg. The mean gradient of the aortic valve  is 2.1 mmHg.  Mitral Valve: The mitral valve is normal in structure. There is mild to moderate mitral valve regurgitation.  Tricuspid Valve: The tricuspid valve is structurally normal. There is trace tricuspid regurgitation.  Pulmonic Valve: The pulmonic valve is not well visualized. The pulmonic valve regurgitation was not well visualized.  Pericardium: There is no pericardial effusion noted.  Aorta: The aortic root is normal.  Pulmonary Artery: The tricuspid regurgitant velocity is 1.50 m/s, and with an estimated right atrial pressure of 10 mmHg, the estimated pulmonary artery pressure is normal with the RVSP at 19.0 mmHg.  Systemic Veins: The inferior vena cava appears to be of normal size.        CONCLUSIONS:   1. The left ventricular systolic function is mildly to moderately decreased with a 40% estimated ejection fraction.   2. Basal and mid inferior wall, basal and mid inferolateral wall, and basal inferoseptal segment are abnormal.   3. Spectral Doppler shows an impaired relaxation pattern of left ventricular diastolic filling.   4. Mild to moderate mitral valve regurgitation.     Holter Monitor  09/30/2024  Patient had a min HR of 47 bpm, max HR of 200 bpm, and avg HR of 66 bpm.  Predominant underlying rhythm was Sinus Rhythm. 4 Ventricular Tachycardia runs  occurred, the run with the fastest interval lasting 5 beats with a max rate of 190  bpm, the longest lasting 8 beats with an avg rate of 110 bpm. 60 Supraventricular  Tachycardia runs occurred, the run with the fastest interval lasting 7 beats with a  max rate of 200 bpm, the longest lasting 13.0 secs with an avg rate of 145 bpm.  Some episodes of Supraventricular Tachycardia may be possible Atrial Tachycardia  with variable block. Isolated SVEs were rare (<1.0%), SVE Couplets were rare  (<1.0%), and SVE Triplets were rare (<1.0%). Isolated VEs were occasional (1.2%,  08666), VE Couplets were rare (<1.0%, 26), and VE Triplets were rare (<1.0%,  4).  Ventricular Bigeminy and Trigeminy were present.      Stress Test  05/31/2022  FINDINGS:  There is a moderate territory of mild to moderate reversibility  involving the entire basal through distal lateral wall, which appears  improved in severity as compared with to prior study. Although there  is no additional discrete territory of abnormal myocardial perfusion,  post-stress images demonstrate the suggestion of transient left  ventricular dilatation with stress with TID value of 1.24, as well as  diminished LVEF from rest to post-stress images (62% to 52%).     The left ventricle is normal in size.     Gated rest images demonstrate normal LV wall motion with an LV EF  estimated at 62% on resting gated images, previously 45%. However, on  gated post-stress images, the LVEF decreases to 52%.     Attenuation correction CT images demonstrate multi-vessel coronary  artery atherosclerosis with RCA stent.     IMPRESSION:  1. Interval improved appearance of moderate territory of mild to  moderate reversibility involving the lateral wall when compared with  2019 MPI, corresponding to an area of chronic total occlusion in the  mid circumflex with collaterals, as demonstrated on prior  catheterization, noting that ischemia does remain.  2. However, there is also the suggestion of transient ischemic  dilatation with stress and post-stress decrease in LVEF, which raises  the concern for balanced ischemia. Please correlate clinically.  3. The left ventricle is normal in size.  4. Normal LVEF at rest, estimated at 62%, decreases to 52% at  post-stress.      Assessment:       Plan:

## 2025-04-14 ENCOUNTER — APPOINTMENT (OUTPATIENT)
Dept: CARDIOLOGY | Facility: HOSPITAL | Age: 85
End: 2025-04-14
Payer: MEDICARE

## 2025-05-08 ENCOUNTER — OFFICE VISIT (OUTPATIENT)
Dept: CARDIOLOGY | Facility: HOSPITAL | Age: 85
End: 2025-05-08
Payer: MEDICARE

## 2025-05-08 VITALS
OXYGEN SATURATION: 96 % | BODY MASS INDEX: 19.56 KG/M2 | HEART RATE: 68 BPM | DIASTOLIC BLOOD PRESSURE: 73 MMHG | SYSTOLIC BLOOD PRESSURE: 159 MMHG | WEIGHT: 101.85 LBS

## 2025-05-08 DIAGNOSIS — I10 BENIGN ESSENTIAL HYPERTENSION: Primary | ICD-10-CM

## 2025-05-08 DIAGNOSIS — I48.0 PAF (PAROXYSMAL ATRIAL FIBRILLATION) (MULTI): Primary | ICD-10-CM

## 2025-05-08 DIAGNOSIS — R00.2 PALPITATIONS: ICD-10-CM

## 2025-05-08 DIAGNOSIS — I25.10 CORONARY ARTERY DISEASE INVOLVING NATIVE CORONARY ARTERY OF NATIVE HEART WITHOUT ANGINA PECTORIS: ICD-10-CM

## 2025-05-08 LAB
ATRIAL RATE: 65 BPM
P AXIS: 57 DEGREES
P OFFSET: 196 MS
P ONSET: 149 MS
PR INTERVAL: 142 MS
Q ONSET: 220 MS
QRS COUNT: 10 BEATS
QRS DURATION: 68 MS
QT INTERVAL: 402 MS
QTC CALCULATION(BAZETT): 418 MS
QTC FREDERICIA: 412 MS
R AXIS: 51 DEGREES
T AXIS: 50 DEGREES
T OFFSET: 421 MS
VENTRICULAR RATE: 65 BPM

## 2025-05-08 PROCEDURE — 3078F DIAST BP <80 MM HG: CPT | Performed by: NURSE PRACTITIONER

## 2025-05-08 PROCEDURE — 3077F SYST BP >= 140 MM HG: CPT | Performed by: NURSE PRACTITIONER

## 2025-05-08 PROCEDURE — 93005 ELECTROCARDIOGRAM TRACING: CPT | Performed by: NURSE PRACTITIONER

## 2025-05-08 PROCEDURE — 1160F RVW MEDS BY RX/DR IN RCRD: CPT | Performed by: NURSE PRACTITIONER

## 2025-05-08 PROCEDURE — 99214 OFFICE O/P EST MOD 30 MIN: CPT | Performed by: NURSE PRACTITIONER

## 2025-05-08 PROCEDURE — 93010 ELECTROCARDIOGRAM REPORT: CPT | Performed by: STUDENT IN AN ORGANIZED HEALTH CARE EDUCATION/TRAINING PROGRAM

## 2025-05-08 PROCEDURE — 1159F MED LIST DOCD IN RCRD: CPT | Performed by: NURSE PRACTITIONER

## 2025-05-08 RX ORDER — NITROGLYCERIN 0.4 MG/1
0.4 TABLET SUBLINGUAL EVERY 5 MIN PRN
Qty: 30 TABLET | Refills: 1 | Status: SHIPPED | OUTPATIENT
Start: 2025-05-08

## 2025-05-08 NOTE — PATIENT INSTRUCTIONS
Thank you for coming to see me today!  Keep taking the same dose of metoprolol, but try taking it at night before you go to bed.  This may help with the fatigue.  I sent a refill for your nitroglycerin if you have chest pain (hopefully you won't need it).    Follow up in 6 months  Radha TURCIOS-CNP

## 2025-05-16 PROBLEM — H35.3211 EXUDATIVE AGE-RELATED MACULAR DEGENERATION, RIGHT EYE, WITH ACTIVE CHOROIDAL NEOVASCULARIZATION: Status: ACTIVE | Noted: 2025-05-16

## 2025-05-16 PROBLEM — H35.3221 EXUDATIVE AGE-RELATED MACULAR DEGENERATION, LEFT EYE, WITH ACTIVE CHOROIDAL NEOVASCULARIZATION: Status: ACTIVE | Noted: 2025-05-16

## 2025-05-16 ASSESSMENT — ENCOUNTER SYMPTOMS
HEADACHES: 0
ORTHOPNEA: 0
COUGH: 0
MYALGIAS: 0
SNORING: 0
FEVER: 0
PALPITATIONS: 1
IRREGULAR HEARTBEAT: 0
HEMOPTYSIS: 0
DIAPHORESIS: 0
LIGHT-HEADEDNESS: 0
NEAR-SYNCOPE: 0
BLURRED VISION: 0
SORE THROAT: 0
DYSPNEA ON EXERTION: 0
ABDOMINAL PAIN: 0
SYNCOPE: 0
DIARRHEA: 0
SPUTUM PRODUCTION: 0
SHORTNESS OF BREATH: 0
FALLS: 0
WEAKNESS: 0
DOUBLE VISION: 0
NAUSEA: 0
PND: 0
DIZZINESS: 0
VOMITING: 0

## 2025-05-16 NOTE — PROGRESS NOTES
Subjective   Marli Duckworth is a 85 y.o. female.    Chief Complaint:  Atrial Fibrillation and Palpitations    Marli Duckworth is an 85 y.o. year old female patient with:     1. Paroxysmal Atrial Fibrillation not on OAC due to history of GIB followed by patient declining to restart OAC after cleared.  2. Nonsustained SVT  3. HTN  4. HLD  5. CAD     Patient was referred to EP due to abnormal monitor.  Her monitor showed sinus rhythm with heart rates 47- bpm.  She had 4 episodes of NSVT lasting up to 8 beats and 60 episodes of nonsustained SVT lasting up to 13 seconds.  PVCs are rare 1.2%.      Nuclear stress test 2022: 1. Interval improved appearance of moderate territory of mild to moderate reversibility involving the lateral wall when compared with 2019 MPI, corresponding to an area of chronic total occlusion in the mid circumflex with collaterals, as demonstrated on prior catheterization, noting that ischemia does remain. 2. However, there is also the suggestion of transient ischemic dilatation with stress and post-stress decrease in LVEF, which raises the concern for balanced ischemia. Please correlate clinically. 3. The left ventricle is normal in size. 4. Normal LVEF at rest, estimated at 62%, decreases to 52% at post-stress.    ECG 5/8/2025 NSR HR 65 bpm    Patient presents for 6 month follow up of palpitations ad PAF.  Last time we saw her, she was having PVCs on her ECG and we increased her metoprolol from 12.5 to 25 mg daily.  She's had a few episodes of palpitations, but admits they only last minutes and do not interfere with her ADLs.  She does feel like she is very tired, but thinks it might we related to activity.    /73   Pulse 68   Wt 46.2 kg (101 lb 13.6 oz)   SpO2 96%   BMI 19.56 kg/m²   Medications Ordered Prior to Encounter[1]      Review of Systems   Constitutional: Negative for diaphoresis, fever and malaise/fatigue.   HENT:  Negative for congestion and sore throat.    Eyes:   Negative for blurred vision and double vision.   Cardiovascular:  Positive for palpitations. Negative for chest pain, dyspnea on exertion, irregular heartbeat, leg swelling, near-syncope, orthopnea, paroxysmal nocturnal dyspnea and syncope.   Respiratory:  Negative for cough, hemoptysis, shortness of breath, snoring and sputum production.    Hematologic/Lymphatic: Negative for bleeding problem.   Skin:  Negative for rash.   Musculoskeletal:  Negative for falls, joint pain and myalgias.   Gastrointestinal:  Negative for abdominal pain, diarrhea, nausea and vomiting.   Neurological:  Negative for dizziness, headaches, light-headedness and weakness.   All other systems reviewed and are negative.      Objective   Constitutional:       Appearance: Healthy appearance. Not in distress.   Eyes:      Conjunctiva/sclera: Conjunctivae normal.      Pupils: Pupils are equal, round, and reactive to light.   HENT:    Mouth/Throat:      Pharynx: Oropharynx is clear.   Pulmonary:      Effort: Pulmonary effort is normal.      Breath sounds: Normal breath sounds. No wheezing. No rhonchi. No rales.   Cardiovascular:      PMI at left midclavicular line. Normal rate. Regular rhythm.      Murmurs: There is no murmur.      No gallop.    Pulses:     Intact distal pulses.   Edema:     Peripheral edema absent.   Abdominal:      General: Bowel sounds are normal.      Palpations: Abdomen is soft.      Tenderness: There is no abdominal tenderness.   Musculoskeletal: Normal range of motion.         General: No tenderness. Skin:     General: Skin is warm and dry.   Neurological:      General: No focal deficit present.      Mental Status: Alert and oriented to person, place and time.         Assessment/Plan   The primary encounter diagnosis was PAF (paroxysmal atrial fibrillation) (Multi). A diagnosis of Palpitations was also pertinent to this visit.  Patient presents today for follow up of her palpitations.  No further PVCs on ECG, palpitations  and significantly reduced.  Given her fatigue, I suggest she take her metoprolol at night and see if that helps.  I offered her a holter monitor to reassess her rhythm and to check for recurrent Afib, but she declined.  She can follow up with general as scheduled  Patient request nitroglycerin refill since her old medication is now 3 years old.  She has not needed, but would prefer to not have  medications if she does need it.   I recommend she follow up with EP in 1 year or sooner as needed         [1]   Current Outpatient Medications on File Prior to Visit   Medication Sig Dispense Refill    albuterol 90 mcg/actuation inhaler Inhale 2 puffs every 4 hours if needed for wheezing. 8 g 11    amLODIPine (Norvasc) 5 mg tablet Take 1 tablet by mouth once daily 90 tablet 0    ascorbic acid (Vitamin C) 500 mg tablet Take 1 tablet (500 mg) by mouth once daily.      aspirin (Aspir-Low) 81 mg EC tablet Take 1 tablet (81 mg) by mouth once daily.      CALCIUM-VIT D3-MAG GLY-ZINC ORAL Take 2 mL by mouth once daily.      cholecalciferol (Vitamin D-3) 50 mcg (2,000 unit) capsule Take 2 capsules (100 mcg) by mouth once daily.      fish oil concentrate (Omega-3) 120-180 mg capsule Take 1 capsule (1 g) by mouth once daily.      ipratropium (Atrovent) 42 mcg (0.06 %) nasal spray Administer 1 spray into each nostril 3 times a day.      LORazepam (Ativan) 0.5 mg tablet TAKE 1 TABLET BY MOUTH AS NEEDED AT BEDTIME FOR INSOMNIA 90 tablet 0    metoprolol succinate XL (Toprol-XL) 25 mg 24 hr tablet Take 1 tablet (25 mg) by mouth once daily. Do not crush or chew. 90 tablet 3    multivitamin tablet Take 1 tablet by mouth once daily.      omega-3s-dha-epa-fish oil (Sea-Omega) 200 mg-300 mg- 100 mg-1,000 mg capsule Take 1 capsule (1,000 mg) by mouth once daily.      rosuvastatin (Crestor) 5 mg tablet Take 1 tablet by mouth once daily 90 tablet 3    sertraline (Zoloft) 50 mg tablet TAKE 1 TABLET BY MOUTH ONCE DAILY AS DIRECTED 90 tablet 2     vit A,C and E-lutein-minerals (Ocuvite with Lutein) 300 mcg-200 mg-27 mg-2 mg tablet Take 1 tablet by mouth once daily.       No current facility-administered medications on file prior to visit.